# Patient Record
Sex: FEMALE | Race: BLACK OR AFRICAN AMERICAN | NOT HISPANIC OR LATINO | Employment: UNEMPLOYED | URBAN - METROPOLITAN AREA
[De-identification: names, ages, dates, MRNs, and addresses within clinical notes are randomized per-mention and may not be internally consistent; named-entity substitution may affect disease eponyms.]

---

## 2020-09-24 LAB
CHLAMYDIA NUCLEIC ACID SCREEN: NEGATIVE
GONOCOCCUS BY NUCLEIC ACID AMP: POSITIVE
PAP RECOMMENDATION EXT: ABNORMAL
PAP SMEAR: ABNORMAL

## 2022-04-07 ENCOUNTER — HISTORICAL (OUTPATIENT)
Dept: ADMINISTRATIVE | Facility: HOSPITAL | Age: 27
End: 2022-04-07
Payer: MEDICAID

## 2022-04-24 VITALS
WEIGHT: 160.94 LBS | DIASTOLIC BLOOD PRESSURE: 70 MMHG | SYSTOLIC BLOOD PRESSURE: 118 MMHG | BODY MASS INDEX: 27.48 KG/M2 | HEIGHT: 64 IN

## 2022-06-04 ENCOUNTER — HOSPITAL ENCOUNTER (EMERGENCY)
Facility: HOSPITAL | Age: 27
Discharge: HOME | End: 2022-06-04
Attending: EMERGENCY MEDICINE
Payer: MEDICAID

## 2022-06-04 VITALS
WEIGHT: 165 LBS | HEART RATE: 78 BPM | DIASTOLIC BLOOD PRESSURE: 74 MMHG | RESPIRATION RATE: 16 BRPM | OXYGEN SATURATION: 97 % | SYSTOLIC BLOOD PRESSURE: 128 MMHG | TEMPERATURE: 98 F | BODY MASS INDEX: 28.52 KG/M2

## 2022-06-04 DIAGNOSIS — J20.9 ACUTE BRONCHITIS, UNSPECIFIED ORGANISM: Primary | ICD-10-CM

## 2022-06-04 LAB
FLUAV AG UPPER RESP QL IA.RAPID: NOT DETECTED
FLUBV AG UPPER RESP QL IA.RAPID: NOT DETECTED
RSV A 5' UTR RNA NPH QL NAA+PROBE: NOT DETECTED
SARS-COV-2 RNA RESP QL NAA+PROBE: NOT DETECTED

## 2022-06-04 PROCEDURE — 87636 SARSCOV2 & INF A&B AMP PRB: CPT | Performed by: NURSE PRACTITIONER

## 2022-06-04 PROCEDURE — 99284 EMERGENCY DEPT VISIT MOD MDM: CPT | Mod: 25

## 2022-06-04 RX ORDER — PREDNISONE 20 MG/1
20 TABLET ORAL DAILY
Qty: 4 TABLET | Refills: 0 | Status: SHIPPED | OUTPATIENT
Start: 2022-06-04 | End: 2022-06-08

## 2022-06-04 RX ORDER — ALBUTEROL SULFATE 90 UG/1
2 AEROSOL, METERED RESPIRATORY (INHALATION) EVERY 6 HOURS PRN
Qty: 18 G | Refills: 0 | Status: SHIPPED | OUTPATIENT
Start: 2022-06-04 | End: 2022-07-04

## 2022-06-04 NOTE — ED PROVIDER NOTES
Encounter Date: 6/4/2022       History     Chief Complaint   Patient presents with    Chest Congestion     C/o sinus & chest congestion, headache, productive cough, & bodyaches starting early this morning     The history is provided by the patient.   URI  The primary symptoms include sore throat, cough and wheezing. Primary symptoms do not include fever, headaches, nausea or vomiting. Primary symptoms comment: runny nose, sinus pain. The current episode started today. This is a new problem.   The sore throat began today. The sore throat has been unchanged since its onset. The sore throat is not accompanied by trouble swallowing or hoarse voice. The sore throat pain is at a severity of 4/10.   The cough began today. The cough is productive. The sputum is yellow and brown.   Wheezing began yesterday. The wheezing has been resolved since its onset. The patient's medical history is significant for asthma.   Symptoms associated with the illness include facial pain, sinus pressure, congestion and rhinorrhea. The illness is not associated with chills. The following treatments were addressed: Acetaminophen was not tried. A decongestant was not tried. Aspirin was not tried. NSAIDs were not tried. Risk factors for severe complications from URI include chronic respiratory disease.     Review of patient's allergies indicates:  No Known Allergies  Past Medical History:   Diagnosis Date    Known health problems: none      Past Surgical History:   Procedure Laterality Date    none       History reviewed. No pertinent family history.  Social History     Tobacco Use    Smoking status: Current Every Day Smoker     Types: Cigarettes   Substance Use Topics    Alcohol use: Not Currently    Drug use: Never     Review of Systems   Constitutional: Negative for chills and fever.   HENT: Positive for congestion, rhinorrhea, sinus pressure and sore throat. Negative for hoarse voice and trouble swallowing.    Respiratory: Positive for  cough and wheezing. Negative for shortness of breath.    Gastrointestinal: Negative for diarrhea, nausea and vomiting.   Genitourinary: Negative for dysuria.   Musculoskeletal: Negative for back pain and neck pain.   Neurological: Negative for headaches.       Physical Exam     Initial Vitals [06/04/22 1607]   BP Pulse Resp Temp SpO2   (!) 134/92 94 18 99 °F (37.2 °C) 100 %      MAP       --         Physical Exam    Nursing note and vitals reviewed.  Constitutional: Vital signs are normal. She appears well-developed and well-nourished. She is cooperative.   HENT:   Head: Normocephalic and atraumatic.   Right Ear: External ear and ear canal normal.   Left Ear: External ear and ear canal normal.   Nose: Mucosal edema and rhinorrhea present. Right sinus exhibits no maxillary sinus tenderness and no frontal sinus tenderness. Left sinus exhibits no maxillary sinus tenderness and no frontal sinus tenderness.   Mouth/Throat: Uvula is midline, oropharynx is clear and moist and mucous membranes are normal. No oropharyngeal exudate.   TMs dull bilaterally   Eyes: Conjunctivae, EOM and lids are normal. Right conjunctiva is not injected. Left conjunctiva is not injected.   Neck:    Full passive range of motion without pain.     Cardiovascular: Normal rate and regular rhythm.   Pulmonary/Chest: Effort normal and breath sounds normal.   Abdominal: Abdomen is soft and flat. There is no abdominal tenderness.   Musculoskeletal:         General: Normal range of motion.      Cervical back: Full passive range of motion without pain.     Neurological: She is alert and oriented to person, place, and time. She has normal strength. Gait normal.   Skin: Skin is warm, dry and intact. No rash noted.   Psychiatric: She has a normal mood and affect. Her speech is normal and behavior is normal. Thought content normal. Cognition and memory are normal.         ED Course   Procedures  Labs Reviewed   COVID/RSV/FLU A&B PCR - Normal     Admission on  06/04/2022   Component Date Value Ref Range Status    Influenza A PCR 06/04/2022 Not Detected  Not Detected Final    Influenza B PCR 06/04/2022 Not Detected  Not Detected Final    Respiratory Syncytial Virus PCR 06/04/2022 Not Detected  Not Detected Final    SARS-CoV-2 PCR 06/04/2022 Not Detected  Not Detected Final            Imaging Results    None          Medications - No data to display  Medical Decision Making:   Clinical Tests:   Lab Tests: Ordered and Reviewed  Pt is awake, alert, oriented x3, in no acute distress, and vital signs stable.  Discussed with pt all pertinent ED information and results. Discussed pt dx of acute bronchitis  and plan of treatment. Gave pt follow up and return to the ED instructions. All questions and concerns were addressed at this time. Pt expresses understanding of information and instructions, and is comfortable with plan to discharge. Pt is stable for discharge.                        Clinical Impression:   Final diagnoses:  [J20.9] Acute bronchitis, unspecified organism (Primary)          ED Disposition Condition    Discharge Stable        ED Prescriptions     Medication Sig Dispense Start Date End Date Auth. Provider    albuterol (PROVENTIL/VENTOLIN HFA) 90 mcg/actuation inhaler Inhale 2 puffs into the lungs every 6 (six) hours as needed for Wheezing. Rescue 18 g 6/4/2022 7/4/2022 BRANDT Fagan    predniSONE (DELTASONE) 20 MG tablet Take 1 tablet (20 mg total) by mouth once daily. for 4 days 4 tablet 6/4/2022 6/8/2022 BRANDT Fagan        Follow-up Information    None          BRANDT Fagan  06/04/22 [id number]

## 2022-06-04 NOTE — DISCHARGE INSTRUCTIONS
Rest at home but not complete bedrest.  Make sure you are drinking adequate fluids.  Alternate acetaminophen 650 mg and ibuprofen 600 mg every 4 hours for fever/pain.  Follow up with primary care provider in 2-3 days  Return to ED for worsening of symptoms, difficulty breathing.  Claritin 10 mg daily as directed on over the counter package.

## 2022-07-11 LAB
PAP RECOMMENDATION EXT: NORMAL
PAP SMEAR: NORMAL

## 2022-08-26 ENCOUNTER — HOSPITAL ENCOUNTER (EMERGENCY)
Facility: HOSPITAL | Age: 27
Discharge: HOME | End: 2022-08-26
Attending: STUDENT IN AN ORGANIZED HEALTH CARE EDUCATION/TRAINING PROGRAM
Payer: MEDICAID

## 2022-08-26 VITALS
HEIGHT: 65 IN | SYSTOLIC BLOOD PRESSURE: 120 MMHG | DIASTOLIC BLOOD PRESSURE: 71 MMHG | RESPIRATION RATE: 18 BRPM | HEART RATE: 119 BPM | WEIGHT: 160.5 LBS | BODY MASS INDEX: 26.74 KG/M2 | OXYGEN SATURATION: 100 % | TEMPERATURE: 100 F

## 2022-08-26 DIAGNOSIS — U07.1 COVID-19: Primary | ICD-10-CM

## 2022-08-26 LAB
FLUAV AG UPPER RESP QL IA.RAPID: NOT DETECTED
FLUBV AG UPPER RESP QL IA.RAPID: NOT DETECTED
RSV A 5' UTR RNA NPH QL NAA+PROBE: NOT DETECTED
SARS-COV-2 RNA RESP QL NAA+PROBE: DETECTED

## 2022-08-26 PROCEDURE — 99282 EMERGENCY DEPT VISIT SF MDM: CPT

## 2022-08-26 PROCEDURE — 87636 SARSCOV2 & INF A&B AMP PRB: CPT | Performed by: NURSE PRACTITIONER

## 2022-08-27 NOTE — ED PROVIDER NOTES
"     Source of History:  Patient    Chief complaint:  Fever (Pt c/o fever, sweating and body aches starting today.)      HPI:  Harley Jones is a 27 y.o. female presenting with body aches, fever, sweating that started today.  Patient states she has been around her mother who was recently sick.  Patient denies any chest pain shortness of breath.  Patient is in no distress at this time.    This is the extent to the patients complaints today here in the emergency department.    ROS: As per HPI and below:  General:  Fever.  No chills.  Eyes: No visual changes.  ENT: No sore throat. No ear pain  Head: No headache.    Chest: No shortness of breath. No cough.  Cardiovascular: No chest pain.  Abdomen: No abdominal pain.  No nausea or vomiting.  Genito-Urinary: No abnormal urination.  Neurologic: No focal weakness.  No numbness.  MSK:  Generalized body aches.  Integument: No rashes or lesions.  Psych: No confusion      Review of patient's allergies indicates:  No Known Allergies    PMH:  As per HPI and below:  Past Medical History:   Diagnosis Date    Known health problems: none      Past Surgical History:   Procedure Laterality Date    none         Social History     Tobacco Use    Smoking status: Current Every Day Smoker     Types: Cigarettes   Substance Use Topics    Alcohol use: Not Currently    Drug use: Never       Physical Exam:    /71 (BP Location: Right arm, Patient Position: Sitting)   Pulse (!) 119   Temp 100.2 °F (37.9 °C)   Resp 18   Ht 5' 5" (1.651 m)   Wt 72.8 kg (160 lb 7.9 oz)   SpO2 100%   BMI 26.71 kg/m²   Nursing note and vital signs reviewed.  Appearance: febrile. Not toxic appearing. No acute distress.  Head: Atraumatic  Eyes: No conjunctival injection. No scleral icterus  ENT: Normal phonation  Chest/ Respiratory: No respiratory distress. No accessory muscle use.  Cardiovascular: Regular rate   Abdomen:  Not distended.    Musculoskeletal: Good range of motion all joints.  No " deformities.  Neck supple.  No meningismus.  Skin: No rashes seen.  Good turgor.  No ecchymoses.  Neurologic: GCS 15. Ambulates with a steady gait.   Mental Status:  Alert and oriented x 3.  Appropriate, conversant    Labs that have been ordered have been independently reviewed and interpreted by myself.    I decided to obtain the patient's medical records.  Summary of Medical Records:  Nursing documentation    Initial Impression/ Differential Dx:  COVID, flu, strep    MDM:    27 y.o. female with body aches, sweating, fever presents emergency department for evaluation.  Patient had COVID test that was done prior to me assuming care and was positive.                   Diagnostic Impression:    1. COVID-19         ED Disposition Condition    Discharge Stable          ED Prescriptions     None        Follow-up Information    None          BRANDT Lane  08/26/22 1947

## 2022-11-10 ENCOUNTER — HOSPITAL ENCOUNTER (OUTPATIENT)
Facility: HOSPITAL | Age: 27
Discharge: HOME | End: 2022-11-11
Attending: EMERGENCY MEDICINE | Admitting: SURGERY
Payer: MEDICAID

## 2022-11-10 ENCOUNTER — HOSPITAL ENCOUNTER (EMERGENCY)
Facility: HOSPITAL | Age: 27
Discharge: ANOTHER HOSPITAL | End: 2022-11-10
Attending: INTERNAL MEDICINE
Payer: MEDICAID

## 2022-11-10 VITALS
SYSTOLIC BLOOD PRESSURE: 117 MMHG | DIASTOLIC BLOOD PRESSURE: 65 MMHG | HEART RATE: 112 BPM | WEIGHT: 150 LBS | BODY MASS INDEX: 22.73 KG/M2 | TEMPERATURE: 98 F | OXYGEN SATURATION: 100 % | RESPIRATION RATE: 20 BRPM | HEIGHT: 68 IN

## 2022-11-10 DIAGNOSIS — F10.920 ACUTE ALCOHOLIC INTOXICATION WITHOUT COMPLICATION: ICD-10-CM

## 2022-11-10 DIAGNOSIS — S09.90XA INJURY OF HEAD, INITIAL ENCOUNTER: Primary | ICD-10-CM

## 2022-11-10 DIAGNOSIS — S00.10XA CONTUSION OF PERIOCULAR REGION, UNSPECIFIED LATERALITY, INITIAL ENCOUNTER: ICD-10-CM

## 2022-11-10 DIAGNOSIS — S06.9X9A CLOSED HEAD INJURY WITH LOSS OF CONSCIOUSNESS OF UNKNOWN DURATION: Primary | ICD-10-CM

## 2022-11-10 DIAGNOSIS — H05.231 PERIORBITAL HEMATOMA OF RIGHT EYE: ICD-10-CM

## 2022-11-10 DIAGNOSIS — R55 SYNCOPE: ICD-10-CM

## 2022-11-10 DIAGNOSIS — J96.90 RESPIRATORY FAILURE: ICD-10-CM
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
## 2022-11-10 DIAGNOSIS — F10.929 ALCOHOLIC INTOXICATION WITH COMPLICATION: ICD-10-CM
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
## 2022-11-10 DIAGNOSIS — R06.81 APNEA: ICD-10-CM

## 2022-11-10 LAB
ALBUMIN SERPL-MCNC: 4.4 GM/DL (ref 3.5–5)
ALBUMIN/GLOB SERPL: 1.3 RATIO (ref 1.1–2)
ALP SERPL-CCNC: 77 UNIT/L (ref 40–150)
ALT SERPL-CCNC: 23 UNIT/L (ref 0–55)
AMPHET UR QL SCN: NEGATIVE
APPEARANCE UR: CLEAR
APTT PPP: 27 SECONDS (ref 23.2–33.7)
AST SERPL-CCNC: 37 UNIT/L (ref 5–34)
B-HCG SERPL QL: NEGATIVE
BACTERIA #/AREA URNS AUTO: ABNORMAL /HPF
BARBITURATE SCN PRESENT UR: NEGATIVE
BASOPHILS # BLD AUTO: 0.02 X10(3)/MCL (ref 0–0.2)
BASOPHILS NFR BLD AUTO: 0.3 %
BENZODIAZ UR QL SCN: NEGATIVE
BILIRUB UR QL STRIP.AUTO: NEGATIVE MG/DL
BILIRUBIN DIRECT+TOT PNL SERPL-MCNC: 0.6 MG/DL
BUN SERPL-MCNC: 11 MG/DL (ref 7–18.7)
CALCIUM SERPL-MCNC: 9.1 MG/DL (ref 8.4–10.2)
CANNABINOIDS UR QL SCN: POSITIVE
CHLORIDE SERPL-SCNC: 108 MMOL/L (ref 98–107)
CO2 SERPL-SCNC: 22 MMOL/L (ref 22–29)
COCAINE UR QL SCN: NEGATIVE
COLOR UR AUTO: YELLOW
CORRECTED TEMPERATURE (PCO2): 36 MMHG (ref 35–45)
CORRECTED TEMPERATURE (PH): 7.35 (ref 7.35–7.45)
CORRECTED TEMPERATURE (PO2): 81 MMHG (ref 80–100)
CREAT SERPL-MCNC: 1.1 MG/DL (ref 0.55–1.02)
EOSINOPHIL # BLD AUTO: 0.01 X10(3)/MCL (ref 0–0.9)
EOSINOPHIL NFR BLD AUTO: 0.1 %
ERYTHROCYTE [DISTWIDTH] IN BLOOD BY AUTOMATED COUNT: 13 % (ref 11.5–17)
ETHANOL SERPL-MCNC: 188 MG/DL
FENTANYL UR QL SCN: NEGATIVE
GFR SERPLBLD CREATININE-BSD FMLA CKD-EPI: >60 MLS/MIN/1.73/M2
GLOBULIN SER-MCNC: 3.4 GM/DL (ref 2.4–3.5)
GLUCOSE SERPL-MCNC: 88 MG/DL (ref 74–100)
GLUCOSE UR QL STRIP.AUTO: NEGATIVE MG/DL
HCO3 UR-SCNC: 19.9 MMOL/L (ref 22–26)
HCT VFR BLD AUTO: 37 % (ref 37–47)
HGB BLD-MCNC: 12.2 G/DL (ref 12–16)
HGB BLD-MCNC: 12.5 GM/DL (ref 12–16)
IMM GRANULOCYTES # BLD AUTO: 0.02 X10(3)/MCL (ref 0–0.04)
IMM GRANULOCYTES NFR BLD AUTO: 0.3 %
INR BLD: 1.16 (ref 0–1.3)
KETONES UR QL STRIP.AUTO: NEGATIVE MG/DL
LACTATE SERPL-SCNC: 2.5 MMOL/L (ref 0.5–2.2)
LEUKOCYTE ESTERASE UR QL STRIP.AUTO: NEGATIVE UNIT/L
LYMPHOCYTES # BLD AUTO: 1.9 X10(3)/MCL (ref 0.6–4.6)
LYMPHOCYTES NFR BLD AUTO: 26.6 %
MCH RBC QN AUTO: 30 PG (ref 27–31)
MCHC RBC AUTO-ENTMCNC: 33.8 MG/DL (ref 33–36)
MCV RBC AUTO: 88.9 FL (ref 80–94)
MDMA UR QL SCN: NEGATIVE
MONOCYTES # BLD AUTO: 0.53 X10(3)/MCL (ref 0.1–1.3)
MONOCYTES NFR BLD AUTO: 7.4 %
NEUTROPHILS # BLD AUTO: 4.7 X10(3)/MCL (ref 2.1–9.2)
NEUTROPHILS NFR BLD AUTO: 65.3 %
NITRITE UR QL STRIP.AUTO: NEGATIVE
OPIATES UR QL SCN: NEGATIVE
PCO2 BLDA: 36 MMHG (ref 35–45)
PCP UR QL: NEGATIVE
PH SMN: 7.35 [PH] (ref 7.35–7.45)
PH UR STRIP.AUTO: 6 [PH]
PH UR: 6 [PH] (ref 3–11)
PLATELET # BLD AUTO: 272 X10(3)/MCL (ref 130–400)
PMV BLD AUTO: 11.9 FL (ref 7.4–10.4)
PO2 BLDA: 81 MMHG (ref 80–100)
POC BASE DEFICIT: -5.1 MMOL/L (ref -2–2)
POC COHB: 1.9 %
POC IONIZED CALCIUM: 1.08 MMOL/L (ref 1.12–1.23)
POC METHB: 1.1 % (ref 0.4–1.5)
POC O2HB: 94.2 % (ref 94–97)
POC SATURATED O2: 95.3 %
POC TEMPERATURE: 37 °C
POTASSIUM BLD-SCNC: 3.7 MMOL/L (ref 3.5–5)
POTASSIUM SERPL-SCNC: 3.6 MMOL/L (ref 3.5–5.1)
PROT SERPL-MCNC: 7.8 GM/DL (ref 6.4–8.3)
PROT UR QL STRIP.AUTO: ABNORMAL MG/DL
PROTHROMBIN TIME: 14.7 SECONDS (ref 12.5–14.5)
RBC # BLD AUTO: 4.16 X10(6)/MCL (ref 4.2–5.4)
RBC #/AREA URNS AUTO: ABNORMAL /HPF
RBC UR QL AUTO: ABNORMAL UNIT/L
SODIUM BLD-SCNC: 140 MMOL/L (ref 137–145)
SODIUM SERPL-SCNC: 143 MMOL/L (ref 136–145)
SP GR UR STRIP.AUTO: 1.01
SPECIFIC GRAVITY, URINE AUTO (.000) (OHS): 1.01 (ref 1–1.03)
SPECIMEN SOURCE: ABNORMAL
SQUAMOUS #/AREA URNS AUTO: ABNORMAL /HPF
T VAGINALIS URNS QL MICRO: ABNORMAL /HPF
UROBILINOGEN UR STRIP-ACNC: 0.2 MG/DL
WBC # SPEC AUTO: 7.2 X10(3)/MCL (ref 4.5–11.5)
WBC #/AREA URNS AUTO: ABNORMAL /HPF

## 2022-11-10 PROCEDURE — 81003 URINALYSIS AUTO W/O SCOPE: CPT | Mod: 59 | Performed by: INTERNAL MEDICINE

## 2022-11-10 PROCEDURE — 96361 HYDRATE IV INFUSION ADD-ON: CPT

## 2022-11-10 PROCEDURE — 27100080 HC AIRWAY ADAPTER-END TIDAL CO2

## 2022-11-10 PROCEDURE — 94761 N-INVAS EAR/PLS OXIMETRY MLT: CPT

## 2022-11-10 PROCEDURE — 27200966 HC CLOSED SUCTION SYSTEM

## 2022-11-10 PROCEDURE — 99291 CRITICAL CARE FIRST HOUR: CPT

## 2022-11-10 PROCEDURE — 63600175 PHARM REV CODE 636 W HCPCS: Performed by: EMERGENCY MEDICINE

## 2022-11-10 PROCEDURE — 80053 COMPREHEN METABOLIC PANEL: CPT | Performed by: INTERNAL MEDICINE

## 2022-11-10 PROCEDURE — 83605 ASSAY OF LACTIC ACID: CPT | Performed by: NURSE PRACTITIONER

## 2022-11-10 PROCEDURE — 93005 ELECTROCARDIOGRAM TRACING: CPT

## 2022-11-10 PROCEDURE — 25500020 PHARM REV CODE 255: Performed by: EMERGENCY MEDICINE

## 2022-11-10 PROCEDURE — G0378 HOSPITAL OBSERVATION PER HR: HCPCS

## 2022-11-10 PROCEDURE — 25000003 PHARM REV CODE 250: Performed by: INTERNAL MEDICINE

## 2022-11-10 PROCEDURE — 99900035 HC TECH TIME PER 15 MIN (STAT)

## 2022-11-10 PROCEDURE — 85025 COMPLETE CBC W/AUTO DIFF WBC: CPT | Performed by: INTERNAL MEDICINE

## 2022-11-10 PROCEDURE — 96374 THER/PROPH/DIAG INJ IV PUSH: CPT

## 2022-11-10 PROCEDURE — 82077 ASSAY SPEC XCP UR&BREATH IA: CPT | Performed by: INTERNAL MEDICINE

## 2022-11-10 PROCEDURE — 96365 THER/PROPH/DIAG IV INF INIT: CPT

## 2022-11-10 PROCEDURE — 96375 TX/PRO/DX INJ NEW DRUG ADDON: CPT | Mod: 59

## 2022-11-10 PROCEDURE — 85610 PROTHROMBIN TIME: CPT | Performed by: INTERNAL MEDICINE

## 2022-11-10 PROCEDURE — 94002 VENT MGMT INPAT INIT DAY: CPT | Mod: 59

## 2022-11-10 PROCEDURE — 25000003 PHARM REV CODE 250: Performed by: NURSE PRACTITIONER

## 2022-11-10 PROCEDURE — 93010 EKG 12-LEAD: ICD-10-PCS | Mod: ,,, | Performed by: INTERNAL MEDICINE

## 2022-11-10 PROCEDURE — 31500 INSERT EMERGENCY AIRWAY: CPT

## 2022-11-10 PROCEDURE — 81025 URINE PREGNANCY TEST: CPT | Performed by: INTERNAL MEDICINE

## 2022-11-10 PROCEDURE — 96372 THER/PROPH/DIAG INJ SC/IM: CPT | Performed by: NURSE PRACTITIONER

## 2022-11-10 PROCEDURE — 96366 THER/PROPH/DIAG IV INF ADDON: CPT

## 2022-11-10 PROCEDURE — 81001 URINALYSIS AUTO W/SCOPE: CPT | Performed by: INTERNAL MEDICINE

## 2022-11-10 PROCEDURE — 80307 DRUG TEST PRSMV CHEM ANLYZR: CPT | Performed by: INTERNAL MEDICINE

## 2022-11-10 PROCEDURE — 85730 THROMBOPLASTIN TIME PARTIAL: CPT | Performed by: INTERNAL MEDICINE

## 2022-11-10 PROCEDURE — 25000003 PHARM REV CODE 250

## 2022-11-10 PROCEDURE — 36600 WITHDRAWAL OF ARTERIAL BLOOD: CPT | Mod: 59

## 2022-11-10 PROCEDURE — 99291 CRITICAL CARE FIRST HOUR: CPT | Mod: 25

## 2022-11-10 PROCEDURE — 63600175 PHARM REV CODE 636 W HCPCS

## 2022-11-10 PROCEDURE — 93010 ELECTROCARDIOGRAM REPORT: CPT | Mod: ,,, | Performed by: INTERNAL MEDICINE

## 2022-11-10 PROCEDURE — 82803 BLOOD GASES ANY COMBINATION: CPT

## 2022-11-10 PROCEDURE — 63600175 PHARM REV CODE 636 W HCPCS: Performed by: NURSE PRACTITIONER

## 2022-11-10 RX ORDER — ONDANSETRON 2 MG/ML
INJECTION INTRAMUSCULAR; INTRAVENOUS
Status: COMPLETED
Start: 2022-11-10 | End: 2022-11-10

## 2022-11-10 RX ORDER — ADHESIVE BANDAGE
30 BANDAGE TOPICAL DAILY PRN
Status: DISCONTINUED | OUTPATIENT
Start: 2022-11-10 | End: 2022-11-11 | Stop reason: HOSPADM

## 2022-11-10 RX ORDER — DOCUSATE SODIUM 100 MG/1
100 CAPSULE, LIQUID FILLED ORAL 2 TIMES DAILY
Status: DISCONTINUED | OUTPATIENT
Start: 2022-11-10 | End: 2022-11-11 | Stop reason: HOSPADM

## 2022-11-10 RX ORDER — ROCURONIUM BROMIDE 10 MG/ML
50 INJECTION, SOLUTION INTRAVENOUS ONCE
Status: COMPLETED | OUTPATIENT
Start: 2022-11-10 | End: 2022-11-10

## 2022-11-10 RX ORDER — SODIUM CHLORIDE 9 MG/ML
1000 INJECTION, SOLUTION INTRAVENOUS
Status: DISCONTINUED | OUTPATIENT
Start: 2022-11-10 | End: 2022-11-10 | Stop reason: HOSPADM

## 2022-11-10 RX ORDER — OXYCODONE HYDROCHLORIDE 5 MG/1
10 TABLET ORAL EVERY 4 HOURS PRN
Status: DISCONTINUED | OUTPATIENT
Start: 2022-11-10 | End: 2022-11-10

## 2022-11-10 RX ORDER — GABAPENTIN 300 MG/1
300 CAPSULE ORAL 3 TIMES DAILY
Status: DISCONTINUED | OUTPATIENT
Start: 2022-11-10 | End: 2022-11-11 | Stop reason: HOSPADM

## 2022-11-10 RX ORDER — FENTANYL CITRATE 50 UG/ML
INJECTION, SOLUTION INTRAMUSCULAR; INTRAVENOUS
Status: DISCONTINUED
Start: 2022-11-10 | End: 2022-11-10 | Stop reason: WASHOUT

## 2022-11-10 RX ORDER — ONDANSETRON 2 MG/ML
4 INJECTION INTRAMUSCULAR; INTRAVENOUS
Status: COMPLETED | OUTPATIENT
Start: 2022-11-10 | End: 2022-11-10

## 2022-11-10 RX ORDER — PROPOFOL 10 MG/ML
20 INJECTION, EMULSION INTRAVENOUS
Status: DISCONTINUED | OUTPATIENT
Start: 2022-11-10 | End: 2022-11-10 | Stop reason: HOSPADM

## 2022-11-10 RX ORDER — OXYCODONE HYDROCHLORIDE 5 MG/1
5 TABLET ORAL EVERY 4 HOURS PRN
Status: DISCONTINUED | OUTPATIENT
Start: 2022-11-10 | End: 2022-11-11 | Stop reason: HOSPADM

## 2022-11-10 RX ORDER — PROPOFOL 10 MG/ML
INJECTION, EMULSION INTRAVENOUS
Status: COMPLETED
Start: 2022-11-10 | End: 2022-11-10

## 2022-11-10 RX ORDER — POLYETHYLENE GLYCOL 3350 17 G/17G
17 POWDER, FOR SOLUTION ORAL 2 TIMES DAILY
Status: DISCONTINUED | OUTPATIENT
Start: 2022-11-10 | End: 2022-11-11 | Stop reason: HOSPADM

## 2022-11-10 RX ORDER — ACETAMINOPHEN 325 MG/1
650 TABLET ORAL EVERY 4 HOURS
Status: DISCONTINUED | OUTPATIENT
Start: 2022-11-10 | End: 2022-11-11 | Stop reason: HOSPADM

## 2022-11-10 RX ORDER — TALC
6 POWDER (GRAM) TOPICAL NIGHTLY PRN
Status: DISCONTINUED | OUTPATIENT
Start: 2022-11-10 | End: 2022-11-11 | Stop reason: HOSPADM

## 2022-11-10 RX ORDER — FENTANYL CITRATE 50 UG/ML
50 INJECTION, SOLUTION INTRAMUSCULAR; INTRAVENOUS
Status: COMPLETED | OUTPATIENT
Start: 2022-11-10 | End: 2022-11-10

## 2022-11-10 RX ORDER — ENOXAPARIN SODIUM 100 MG/ML
40 INJECTION SUBCUTANEOUS EVERY 12 HOURS
Status: DISCONTINUED | OUTPATIENT
Start: 2022-11-10 | End: 2022-11-11 | Stop reason: HOSPADM

## 2022-11-10 RX ORDER — SODIUM CHLORIDE 9 MG/ML
INJECTION, SOLUTION INTRAVENOUS CONTINUOUS
Status: DISCONTINUED | OUTPATIENT
Start: 2022-11-10 | End: 2022-11-11 | Stop reason: HOSPADM

## 2022-11-10 RX ORDER — ETOMIDATE 2 MG/ML
10 INJECTION INTRAVENOUS
Status: COMPLETED | OUTPATIENT
Start: 2022-11-10 | End: 2022-11-10

## 2022-11-10 RX ORDER — METHOCARBAMOL 750 MG/1
750 TABLET, FILM COATED ORAL 3 TIMES DAILY
Status: DISCONTINUED | OUTPATIENT
Start: 2022-11-10 | End: 2022-11-11 | Stop reason: HOSPADM

## 2022-11-10 RX ADMIN — ONDANSETRON 4 MG: 2 INJECTION INTRAMUSCULAR; INTRAVENOUS at 06:11

## 2022-11-10 RX ADMIN — ROCURONIUM BROMIDE 50 MG: 10 INJECTION, SOLUTION INTRAVENOUS at 04:11

## 2022-11-10 RX ADMIN — METHOCARBAMOL 750 MG: 750 TABLET ORAL at 09:11

## 2022-11-10 RX ADMIN — SODIUM CHLORIDE 1360 ML: 9 INJECTION, SOLUTION INTRAVENOUS at 05:11

## 2022-11-10 RX ADMIN — SODIUM CHLORIDE: 9 INJECTION, SOLUTION INTRAVENOUS at 09:11

## 2022-11-10 RX ADMIN — IOPAMIDOL 100 ML: 755 INJECTION, SOLUTION INTRAVENOUS at 06:11

## 2022-11-10 RX ADMIN — ETOMIDATE 10 MG: 2 INJECTION INTRAVENOUS at 04:11

## 2022-11-10 RX ADMIN — FENTANYL CITRATE 50 MCG: 50 INJECTION INTRAMUSCULAR; INTRAVENOUS at 07:11

## 2022-11-10 RX ADMIN — ACETAMINOPHEN 650 MG: 325 TABLET, FILM COATED ORAL at 09:11

## 2022-11-10 RX ADMIN — DOCUSATE SODIUM 100 MG: 100 CAPSULE, LIQUID FILLED ORAL at 09:11

## 2022-11-10 RX ADMIN — ENOXAPARIN SODIUM 40 MG: 40 INJECTION SUBCUTANEOUS at 09:11

## 2022-11-10 RX ADMIN — GABAPENTIN 300 MG: 300 CAPSULE ORAL at 09:11

## 2022-11-10 RX ADMIN — PROPOFOL 1000 MG: 10 INJECTION, EMULSION INTRAVENOUS at 05:11
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
## 2022-11-11 VITALS
DIASTOLIC BLOOD PRESSURE: 63 MMHG | SYSTOLIC BLOOD PRESSURE: 110 MMHG | TEMPERATURE: 98 F | RESPIRATION RATE: 16 BRPM | OXYGEN SATURATION: 98 % | HEART RATE: 76 BPM

## 2022-11-11 LAB
ALBUMIN SERPL-MCNC: 3.9 GM/DL (ref 3.5–5)
ALBUMIN/GLOB SERPL: 1.4 RATIO (ref 1.1–2)
ALP SERPL-CCNC: 90 UNIT/L (ref 40–150)
ALT SERPL-CCNC: 23 UNIT/L (ref 0–55)
AST SERPL-CCNC: 38 UNIT/L (ref 5–34)
BASOPHILS # BLD AUTO: 0.04 X10(3)/MCL (ref 0–0.2)
BASOPHILS NFR BLD AUTO: 0.6 %
BILIRUBIN DIRECT+TOT PNL SERPL-MCNC: 1 MG/DL
BUN SERPL-MCNC: 10.2 MG/DL (ref 7–18.7)
CALCIUM SERPL-MCNC: 8.7 MG/DL (ref 8.4–10.2)
CHLORIDE SERPL-SCNC: 107 MMOL/L (ref 98–107)
CO2 SERPL-SCNC: 21 MMOL/L (ref 22–29)
CREAT SERPL-MCNC: 0.82 MG/DL (ref 0.55–1.02)
EOSINOPHIL # BLD AUTO: 0.05 X10(3)/MCL (ref 0–0.9)
EOSINOPHIL NFR BLD AUTO: 0.7 %
ERYTHROCYTE [DISTWIDTH] IN BLOOD BY AUTOMATED COUNT: 13.3 % (ref 11.5–17)
GFR SERPLBLD CREATININE-BSD FMLA CKD-EPI: >60 MLS/MIN/1.73/M2
GLOBULIN SER-MCNC: 2.7 GM/DL (ref 2.4–3.5)
GLUCOSE SERPL-MCNC: 65 MG/DL (ref 74–100)
HCT VFR BLD AUTO: 35.5 % (ref 37–47)
HGB BLD-MCNC: 11.9 GM/DL (ref 12–16)
IMM GRANULOCYTES # BLD AUTO: 0.03 X10(3)/MCL (ref 0–0.04)
IMM GRANULOCYTES NFR BLD AUTO: 0.4 %
LACTATE SERPL-SCNC: 0.6 MMOL/L (ref 0.5–2.2)
LYMPHOCYTES # BLD AUTO: 2.23 X10(3)/MCL (ref 0.6–4.6)
LYMPHOCYTES NFR BLD AUTO: 33.4 %
MAGNESIUM SERPL-MCNC: 1.8 MG/DL (ref 1.6–2.6)
MCH RBC QN AUTO: 30.8 PG (ref 27–31)
MCHC RBC AUTO-ENTMCNC: 33.5 MG/DL (ref 33–36)
MCV RBC AUTO: 92 FL (ref 80–94)
MONOCYTES # BLD AUTO: 0.47 X10(3)/MCL (ref 0.1–1.3)
MONOCYTES NFR BLD AUTO: 7 %
NEUTROPHILS # BLD AUTO: 3.9 X10(3)/MCL (ref 2.1–9.2)
NEUTROPHILS NFR BLD AUTO: 57.9 %
NRBC BLD AUTO-RTO: 0 %
PHOSPHATE SERPL-MCNC: 3.8 MG/DL (ref 2.3–4.7)
PLATELET # BLD AUTO: 208 X10(3)/MCL (ref 130–400)
PMV BLD AUTO: 11.5 FL (ref 7.4–10.4)
POTASSIUM SERPL-SCNC: 3.7 MMOL/L (ref 3.5–5.1)
PROT SERPL-MCNC: 6.6 GM/DL (ref 6.4–8.3)
RBC # BLD AUTO: 3.86 X10(6)/MCL (ref 4.2–5.4)
SODIUM SERPL-SCNC: 138 MMOL/L (ref 136–145)
WBC # SPEC AUTO: 6.7 X10(3)/MCL (ref 4.5–11.5)

## 2022-11-11 PROCEDURE — 83735 ASSAY OF MAGNESIUM: CPT | Performed by: NURSE PRACTITIONER

## 2022-11-11 PROCEDURE — G0378 HOSPITAL OBSERVATION PER HR: HCPCS

## 2022-11-11 PROCEDURE — 63600175 PHARM REV CODE 636 W HCPCS: Performed by: NURSE PRACTITIONER

## 2022-11-11 PROCEDURE — 36415 COLL VENOUS BLD VENIPUNCTURE: CPT | Performed by: NURSE PRACTITIONER

## 2022-11-11 PROCEDURE — 63600175 PHARM REV CODE 636 W HCPCS: Performed by: STUDENT IN AN ORGANIZED HEALTH CARE EDUCATION/TRAINING PROGRAM

## 2022-11-11 PROCEDURE — 85025 COMPLETE CBC W/AUTO DIFF WBC: CPT | Performed by: NURSE PRACTITIONER

## 2022-11-11 PROCEDURE — 84100 ASSAY OF PHOSPHORUS: CPT | Performed by: NURSE PRACTITIONER

## 2022-11-11 PROCEDURE — 25000003 PHARM REV CODE 250: Performed by: NURSE PRACTITIONER

## 2022-11-11 PROCEDURE — 83605 ASSAY OF LACTIC ACID: CPT | Performed by: NURSE PRACTITIONER

## 2022-11-11 PROCEDURE — 80053 COMPREHEN METABOLIC PANEL: CPT | Performed by: NURSE PRACTITIONER

## 2022-11-11 PROCEDURE — 96372 THER/PROPH/DIAG INJ SC/IM: CPT | Performed by: NURSE PRACTITIONER

## 2022-11-11 RX ORDER — MAGNESIUM SULFATE HEPTAHYDRATE 40 MG/ML
2 INJECTION, SOLUTION INTRAVENOUS ONCE
Status: COMPLETED | OUTPATIENT
Start: 2022-11-11 | End: 2022-11-11

## 2022-11-11 RX ORDER — OXYCODONE HYDROCHLORIDE 5 MG/1
5 TABLET ORAL EVERY 4 HOURS PRN
Qty: 10 TABLET | Refills: 0 | Status: SHIPPED | OUTPATIENT
Start: 2022-11-11 | End: 2023-07-13

## 2022-11-11 RX ADMIN — OXYCODONE 5 MG: 5 TABLET ORAL at 01:11

## 2022-11-11 RX ADMIN — POLYETHYLENE GLYCOL 3350 17 G: 17 POWDER, FOR SOLUTION ORAL at 08:11

## 2022-11-11 RX ADMIN — GABAPENTIN 300 MG: 300 CAPSULE ORAL at 08:11

## 2022-11-11 RX ADMIN — MAGNESIUM SULFATE HEPTAHYDRATE 2 G: 40 INJECTION, SOLUTION INTRAVENOUS at 08:11

## 2022-11-11 RX ADMIN — ENOXAPARIN SODIUM 40 MG: 40 INJECTION SUBCUTANEOUS at 08:11

## 2022-11-11 RX ADMIN — ACETAMINOPHEN 650 MG: 325 TABLET, FILM COATED ORAL at 06:11

## 2022-11-11 RX ADMIN — ACETAMINOPHEN 650 MG: 325 TABLET, FILM COATED ORAL at 09:11

## 2022-11-11 RX ADMIN — OXYCODONE 5 MG: 5 TABLET ORAL at 11:11

## 2022-11-11 RX ADMIN — DOCUSATE SODIUM 100 MG: 100 CAPSULE, LIQUID FILLED ORAL at 08:11

## 2022-11-11 RX ADMIN — METHOCARBAMOL 750 MG: 750 TABLET ORAL at 08:11

## 2022-11-11 NOTE — ASSESSMENT & PLAN NOTE
Admit to floor  q2 neuro checks  CT in AM if not improved  Lovenox  Regular diet  DC in next 12-24 hours
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
## 2022-11-11 NOTE — SUBJECTIVE & OBJECTIVE
Current Facility-Administered Medications on File Prior to Encounter   Medication    [COMPLETED] etomidate injection 10 mg    [COMPLETED] propofoL (DIPRIVAN) 10 mg/mL infusion    [COMPLETED] rocuronium injection 50 mg    [DISCONTINUED] 0.9%  NaCl infusion    [DISCONTINUED] propofol (DIPRIVAN) 10 mg/mL infusion    [COMPLETED] sodium chloride 0.9% bolus 1,360 mL     Current Outpatient Medications on File Prior to Encounter   Medication Sig    albuterol (PROVENTIL/VENTOLIN HFA) 90 mcg/actuation inhaler Inhale 2 puffs into the lungs every 6 (six) hours as needed for Wheezing. Rescue       Review of patient's allergies indicates:  No Known Allergies    Past Medical History:   Diagnosis Date    Asthma     Known health problems: none      Past Surgical History:   Procedure Laterality Date     SECTION      none       Family History       Problem Relation (Age of Onset)    Hypertension Mother, Father          Tobacco Use    Smoking status: Every Day     Types: Cigarettes    Smokeless tobacco: Not on file   Substance and Sexual Activity    Alcohol use: Not Currently    Drug use: Never    Sexual activity: Not on file     Review of Systems   Constitutional:  Negative for chills and fever.   HENT:  Positive for facial swelling. Negative for ear pain and trouble swallowing.    Eyes:  Negative for pain and redness.   Respiratory:  Negative for cough and chest tightness.    Cardiovascular:  Negative for chest pain, palpitations and leg swelling.   Gastrointestinal:  Negative for abdominal distention, abdominal pain, nausea and vomiting.   Genitourinary:  Negative for difficulty urinating.   Musculoskeletal:  Negative for back pain and neck pain.   Skin:  Positive for wound. Negative for color change and pallor.   Neurological:  Positive for dizziness, speech difficulty and light-headedness. Negative for syncope, weakness, numbness and headaches.   Psychiatric/Behavioral:  Negative for agitation and suicidal ideas.    All  other systems reviewed and are negative.  Objective:     Vital Signs (Most Recent):  Temp: 98.1 °F (36.7 °C) (11/10/22 1829)  Pulse: 88 (11/10/22 1835)  Resp: 16 (11/10/22 1939)  BP: 123/79 (11/10/22 1835)  SpO2: 96 % (11/10/22 1835) Vital Signs (24h Range):  Temp:  [98.1 °F (36.7 °C)-98.4 °F (36.9 °C)] 98.1 °F (36.7 °C)  Pulse:  [] 88  Resp:  [16-33] 16  SpO2:  [93 %-100 %] 96 %  BP: (117-172)/() 123/79        There is no height or weight on file to calculate BMI.    Physical Exam  Constitutional:       Appearance: Normal appearance.   HENT:      Head: Normocephalic and atraumatic.      Nose: Nose normal.   Eyes:      Pupils: Pupils are equal, round, and reactive to light.      Comments: Swelling and bruising to L eye.    Cardiovascular:      Rate and Rhythm: Normal rate.      Pulses: Normal pulses.      Comments: Normal peripheral pulses  Pulmonary:      Effort: Pulmonary effort is normal. No respiratory distress.   Chest:      Chest wall: No tenderness.   Abdominal:      General: Abdomen is flat. Bowel sounds are normal. There is no distension.      Palpations: Abdomen is soft.      Tenderness: There is no abdominal tenderness.   Musculoskeletal:         General: No swelling, tenderness, deformity or signs of injury.      Cervical back: Normal range of motion and neck supple. No tenderness.   Skin:     General: Skin is warm and dry.      Capillary Refill: Capillary refill takes less than 2 seconds.      Findings: No lesion.   Neurological:      General: No focal deficit present.      Mental Status: She is alert.      Comments: Opens eyes to voice, shakes head, does not speak, follows commands.    Psychiatric:         Mood and Affect: Mood normal.         Behavior: Behavior normal.         Thought Content: Thought content normal.         Judgment: Judgment normal.       Significant Labs:  I have reviewed all pertinent lab results within the past 24 hours.    Significant Diagnostics:  I have reviewed  all pertinent imaging results/findings within the past 24 hours.
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
## 2023-01-27 ENCOUNTER — DOCUMENTATION ONLY (OUTPATIENT)
Dept: ADMINISTRATIVE | Facility: HOSPITAL | Age: 28
End: 2023-01-27
Payer: MEDICAID

## 2023-01-31 ENCOUNTER — HOSPITAL ENCOUNTER (EMERGENCY)
Facility: HOSPITAL | Age: 28
Discharge: HOME | End: 2023-01-31
Attending: INTERNAL MEDICINE
Payer: MEDICAID

## 2023-01-31 VITALS
HEIGHT: 66 IN | DIASTOLIC BLOOD PRESSURE: 82 MMHG | BODY MASS INDEX: 27.16 KG/M2 | TEMPERATURE: 98 F | OXYGEN SATURATION: 100 % | WEIGHT: 169 LBS | RESPIRATION RATE: 20 BRPM | SYSTOLIC BLOOD PRESSURE: 131 MMHG | HEART RATE: 91 BPM

## 2023-01-31 DIAGNOSIS — J20.9 ACUTE BRONCHITIS, UNSPECIFIED ORGANISM: Primary | ICD-10-CM

## 2023-01-31 LAB
FLUAV AG UPPER RESP QL IA.RAPID: NOT DETECTED
FLUBV AG UPPER RESP QL IA.RAPID: NOT DETECTED
SARS-COV-2 RNA RESP QL NAA+PROBE: NOT DETECTED

## 2023-01-31 PROCEDURE — 0240U COVID/FLU A&B PCR: CPT | Performed by: INTERNAL MEDICINE

## 2023-01-31 PROCEDURE — 94640 AIRWAY INHALATION TREATMENT: CPT

## 2023-01-31 PROCEDURE — 25000242 PHARM REV CODE 250 ALT 637 W/ HCPCS: Performed by: NURSE PRACTITIONER

## 2023-01-31 PROCEDURE — 99284 EMERGENCY DEPT VISIT MOD MDM: CPT | Mod: 25

## 2023-01-31 PROCEDURE — 63600175 PHARM REV CODE 636 W HCPCS: Performed by: NURSE PRACTITIONER

## 2023-01-31 PROCEDURE — 94761 N-INVAS EAR/PLS OXIMETRY MLT: CPT

## 2023-01-31 PROCEDURE — 99900031 HC PATIENT EDUCATION (STAT)

## 2023-01-31 RX ORDER — PROMETHAZINE HYDROCHLORIDE AND DEXTROMETHORPHAN HYDROBROMIDE 6.25; 15 MG/5ML; MG/5ML
5 SYRUP ORAL EVERY 8 HOURS PRN
Qty: 150 ML | Refills: 0 | Status: SHIPPED | OUTPATIENT
Start: 2023-01-31 | End: 2023-02-10

## 2023-01-31 RX ORDER — ALBUTEROL SULFATE 90 UG/1
1-2 AEROSOL, METERED RESPIRATORY (INHALATION) EVERY 6 HOURS PRN
Qty: 18 G | Refills: 0 | Status: SHIPPED | OUTPATIENT
Start: 2023-01-31 | End: 2023-07-13

## 2023-01-31 RX ORDER — IPRATROPIUM BROMIDE AND ALBUTEROL SULFATE 2.5; .5 MG/3ML; MG/3ML
3 SOLUTION RESPIRATORY (INHALATION)
Status: COMPLETED | OUTPATIENT
Start: 2023-01-31 | End: 2023-01-31

## 2023-01-31 RX ORDER — PREDNISONE 20 MG/1
40 TABLET ORAL
Status: COMPLETED | OUTPATIENT
Start: 2023-01-31 | End: 2023-01-31

## 2023-01-31 RX ORDER — PREDNISONE 20 MG/1
40 TABLET ORAL DAILY
Qty: 10 TABLET | Refills: 0 | Status: SHIPPED | OUTPATIENT
Start: 2023-01-31 | End: 2023-02-05

## 2023-01-31 RX ADMIN — PREDNISONE 40 MG: 20 TABLET ORAL at 12:01

## 2023-01-31 RX ADMIN — IPRATROPIUM BROMIDE AND ALBUTEROL SULFATE 3 ML: .5; 3 SOLUTION RESPIRATORY (INHALATION) at 12:01

## 2023-01-31 NOTE — ED PROVIDER NOTES
Encounter Date: 2023       History     Chief Complaint   Patient presents with    Cough     Cough, runny nose and fever since last night     29 yo female presents with her daughter (who is also sick) for cough, wheezing, congestion and subjective fever. She states she has hx asthma, out of inhaler. Nonsmoker. She feels tight and wheezing.     The history is provided by the patient. No  was used.   Cough  This is a new problem. The current episode started today. Associated symptoms include wheezing.   Review of patient's allergies indicates:  No Known Allergies  Past Medical History:   Diagnosis Date    Asthma     Known health problems: none      Past Surgical History:   Procedure Laterality Date     SECTION      none       Family History   Problem Relation Age of Onset    Hypertension Mother     Hypertension Father      Social History     Tobacco Use    Smoking status: Former     Types: Cigarettes    Smokeless tobacco: Never   Substance Use Topics    Alcohol use: Not Currently    Drug use: Yes     Types: Marijuana     Review of Systems   Constitutional:  Positive for fever.   HENT:  Positive for congestion and postnasal drip.    Respiratory:  Positive for cough and wheezing.    All other systems reviewed and are negative.    Physical Exam     Initial Vitals [23 1059]   BP Pulse Resp Temp SpO2   136/87 94 (!) 22 98.1 °F (36.7 °C) 98 %      MAP       --         Physical Exam    Nursing note and vitals reviewed.  Constitutional: She appears well-developed and well-nourished.   HENT:   Left Ear: Tympanic membrane and ear canal normal.   Right ear canal cerumen impaction noted, unable to visualize TM. No pain with palpation or tugging   Eyes: Conjunctivae are normal.   Cardiovascular:  Normal rate, regular rhythm and normal heart sounds.           Pulmonary/Chest: No respiratory distress. She has wheezes (diffuse, anterior and posterior).     Neurological: She is alert and oriented  to person, place, and time.   Skin: Skin is warm and dry.   Psychiatric: She has a normal mood and affect.       ED Course   Procedures  Labs Reviewed   COVID/FLU A&B PCR - Normal    Narrative:     The Xpert Xpress SARS-CoV-2/FLU/RSV plus is a rapid, multiplexed real-time PCR test intended for the simultaneous qualitative detection and differentiation of SARS-CoV-2, Influenza A, Influenza B, and respiratory syncytial virus (RSV) viral RNA in either nasopharyngeal swab or nasal swab specimens.                Imaging Results    None          Medications   albuterol-ipratropium 2.5 mg-0.5 mg/3 mL nebulizer solution 3 mL (3 mLs Nebulization Given 1/31/23 1243)   predniSONE tablet 40 mg (40 mg Oral Given 1/31/23 1230)     Medical Decision Making:   History:   Old Medical Records: I decided to obtain old medical records.  Old Records Summarized: other records.       <> Summary of Records: Reviewed medication list - no recent inahlers RX    Also reviewed previous ER visits for similar of cough and wheezing - bronchitis  Differential Diagnosis:   URI; bronchitis; covid; flu  Clinical Tests:   Lab Tests: Ordered and Reviewed       <> Summary of Lab: Covid/flu negative  ED Management:  Swab negative. +wheezing noted -- duo neb given along with prednisone. Will treat as URI vs bronchitis                        Clinical Impression:   Final diagnoses:  [J20.9] Acute bronchitis, unspecified organism (Primary)        ED Disposition Condition    Discharge Stable          ED Prescriptions       Medication Sig Dispense Start Date End Date Auth. Provider    predniSONE (DELTASONE) 20 MG tablet Take 2 tablets (40 mg total) by mouth once daily. for 5 days 10 tablet 1/31/2023 2/5/2023 BRANDT Smith    albuterol (PROVENTIL/VENTOLIN HFA) 90 mcg/actuation inhaler Inhale 1-2 puffs into the lungs every 6 (six) hours as needed for Wheezing. Rescue 18 g 1/31/2023 -- BRANDT Smith    promethazine-dextromethorphan (PROMETHAZINE-DM)  6.25-15 mg/5 mL Syrp Take 5 mLs by mouth every 8 (eight) hours as needed (cough). 150 mL 1/31/2023 2/10/2023 BRANDT Smith          Follow-up Information       Follow up With Specialties Details Why Contact Info    primary care provider  Call in 1 week As needed, If symptoms worsen              BRANDT Smith  01/31/23 [id number]

## 2023-01-31 NOTE — Clinical Note
"Harley Nelsonrufina"Robert was seen and treated in our emergency department on 1/31/2023.  She may return to work on 02/03/2023.       If you have any questions or concerns, please don't hesitate to call.      BRANDT Smith"

## 2023-01-31 NOTE — DISCHARGE INSTRUCTIONS
Hydrate. Increase vitamin C up to 3000mg per day. Prednisone daily for 5 days (start tomorrow since given dose in ER today). Use inhaler every 4-6 hours for wheezing/tightness. Tylenol and ibuprofen for pain/fever as needed. Take claritin daily. Promethazine dm for cough as needed.

## 2023-04-09 ENCOUNTER — HOSPITAL ENCOUNTER (EMERGENCY)
Facility: HOSPITAL | Age: 28
Discharge: HOME | End: 2023-04-09
Attending: INTERNAL MEDICINE
Payer: MEDICAID

## 2023-04-09 VITALS
HEIGHT: 66 IN | HEART RATE: 86 BPM | WEIGHT: 155 LBS | DIASTOLIC BLOOD PRESSURE: 71 MMHG | BODY MASS INDEX: 24.91 KG/M2 | TEMPERATURE: 98 F | OXYGEN SATURATION: 96 % | SYSTOLIC BLOOD PRESSURE: 145 MMHG | RESPIRATION RATE: 16 BRPM

## 2023-04-09 DIAGNOSIS — S00.83XA TRAUMATIC HEMATOMA OF FOREHEAD, INITIAL ENCOUNTER: ICD-10-CM

## 2023-04-09 LAB — B-HCG SERPL QL: NEGATIVE

## 2023-04-09 PROCEDURE — 99283 EMERGENCY DEPT VISIT LOW MDM: CPT

## 2023-04-09 PROCEDURE — 81025 URINE PREGNANCY TEST: CPT | Performed by: INTERNAL MEDICINE
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
## 2023-07-13 ENCOUNTER — OFFICE VISIT (OUTPATIENT)
Dept: OBSTETRICS AND GYNECOLOGY | Facility: CLINIC | Age: 28
End: 2023-07-13
Payer: MEDICAID

## 2023-07-13 VITALS
DIASTOLIC BLOOD PRESSURE: 82 MMHG | SYSTOLIC BLOOD PRESSURE: 120 MMHG | TEMPERATURE: 98 F | BODY MASS INDEX: 27.52 KG/M2 | HEIGHT: 64 IN | WEIGHT: 161.19 LBS

## 2023-07-13 DIAGNOSIS — A59.01 TRICHOMONAL VAGINITIS: ICD-10-CM

## 2023-07-13 DIAGNOSIS — Z01.411 ABNORMAL GYNECOLOGICAL EXAMINATION: Primary | ICD-10-CM

## 2023-07-13 DIAGNOSIS — Z12.4 SCREENING FOR MALIGNANT NEOPLASM OF THE CERVIX: ICD-10-CM

## 2023-07-13 DIAGNOSIS — N92.0 MENORRHAGIA WITH REGULAR CYCLE: ICD-10-CM

## 2023-07-13 DIAGNOSIS — Z11.3 SCREENING EXAMINATION FOR VENEREAL DISEASE: ICD-10-CM

## 2023-07-13 LAB
BACTERIA HYPHAE, POC: NEGATIVE
GARDNERELLA VAGINALIS: NEGATIVE
OTHER MICROSC. OBSERVATIONS: POSITIVE
POC BACTERIAL VAGINOSIS: NEGATIVE
POC CLUE CELLS: NEGATIVE
TRICHOMONAS, POC: POSITIVE
YEAST WET PREP: NEGATIVE
YEAST, POC: NEGATIVE

## 2023-07-13 PROCEDURE — 3074F SYST BP LT 130 MM HG: CPT | Mod: CPTII,,, | Performed by: NURSE PRACTITIONER

## 2023-07-13 PROCEDURE — 1159F MED LIST DOCD IN RCRD: CPT | Mod: CPTII,,, | Performed by: NURSE PRACTITIONER

## 2023-07-13 PROCEDURE — 3079F PR MOST RECENT DIASTOLIC BLOOD PRESSURE 80-89 MM HG: ICD-10-PCS | Mod: CPTII,,, | Performed by: NURSE PRACTITIONER

## 2023-07-13 PROCEDURE — 1160F PR REVIEW ALL MEDS BY PRESCRIBER/CLIN PHARMACIST DOCUMENTED: ICD-10-PCS | Mod: CPTII,,, | Performed by: NURSE PRACTITIONER

## 2023-07-13 PROCEDURE — 3074F PR MOST RECENT SYSTOLIC BLOOD PRESSURE < 130 MM HG: ICD-10-PCS | Mod: CPTII,,, | Performed by: NURSE PRACTITIONER

## 2023-07-13 PROCEDURE — 3008F BODY MASS INDEX DOCD: CPT | Mod: CPTII,,, | Performed by: NURSE PRACTITIONER

## 2023-07-13 PROCEDURE — 87220 TISSUE EXAM FOR FUNGI: CPT | Mod: QW,,, | Performed by: NURSE PRACTITIONER

## 2023-07-13 PROCEDURE — 1160F RVW MEDS BY RX/DR IN RCRD: CPT | Mod: CPTII,,, | Performed by: NURSE PRACTITIONER

## 2023-07-13 PROCEDURE — 1159F PR MEDICATION LIST DOCUMENTED IN MEDICAL RECORD: ICD-10-PCS | Mod: CPTII,,, | Performed by: NURSE PRACTITIONER

## 2023-07-13 PROCEDURE — 3079F DIAST BP 80-89 MM HG: CPT | Mod: CPTII,,, | Performed by: NURSE PRACTITIONER

## 2023-07-13 PROCEDURE — 87220 POCT KOH: ICD-10-PCS | Mod: QW,,, | Performed by: NURSE PRACTITIONER

## 2023-07-13 PROCEDURE — 99395 PR PREVENTIVE VISIT,EST,18-39: ICD-10-PCS | Mod: ,,, | Performed by: NURSE PRACTITIONER

## 2023-07-13 PROCEDURE — 3008F PR BODY MASS INDEX (BMI) DOCUMENTED: ICD-10-PCS | Mod: CPTII,,, | Performed by: NURSE PRACTITIONER

## 2023-07-13 PROCEDURE — 99395 PREV VISIT EST AGE 18-39: CPT | Mod: ,,, | Performed by: NURSE PRACTITIONER

## 2023-07-13 PROCEDURE — 87210 SMEAR WET MOUNT SALINE/INK: CPT | Mod: QW,,, | Performed by: NURSE PRACTITIONER

## 2023-07-13 PROCEDURE — 87210 POCT WET PREP: ICD-10-PCS | Mod: QW,,, | Performed by: NURSE PRACTITIONER

## 2023-07-13 RX ORDER — METRONIDAZOLE 500 MG/1
500 TABLET ORAL EVERY 12 HOURS
Qty: 14 TABLET | Refills: 0 | Status: SHIPPED | OUTPATIENT
Start: 2023-07-13 | End: 2023-07-20

## 2023-07-18 LAB — PSYCHE PATHOLOGY RESULT: NORMAL

## 2023-07-24 ENCOUNTER — HOSPITAL ENCOUNTER (EMERGENCY)
Facility: HOSPITAL | Age: 28
Discharge: HOME | End: 2023-07-24
Attending: INTERNAL MEDICINE
Payer: MEDICAID

## 2023-07-24 VITALS
HEART RATE: 117 BPM | OXYGEN SATURATION: 97 % | HEIGHT: 64 IN | SYSTOLIC BLOOD PRESSURE: 110 MMHG | RESPIRATION RATE: 18 BRPM | WEIGHT: 156.38 LBS | DIASTOLIC BLOOD PRESSURE: 76 MMHG | TEMPERATURE: 101 F | BODY MASS INDEX: 26.7 KG/M2

## 2023-07-24 DIAGNOSIS — J02.0 STREP PHARYNGITIS: Primary | ICD-10-CM

## 2023-07-24 LAB
SARS-COV-2 RDRP RESP QL NAA+PROBE: NEGATIVE
STREP A PCR (OHS): DETECTED

## 2023-07-24 PROCEDURE — 99284 EMERGENCY DEPT VISIT MOD MDM: CPT

## 2023-07-24 PROCEDURE — 87651 STREP A DNA AMP PROBE: CPT | Performed by: NURSE PRACTITIONER

## 2023-07-24 PROCEDURE — 25000003 PHARM REV CODE 250: Performed by: NURSE PRACTITIONER

## 2023-07-24 PROCEDURE — 87635 SARS-COV-2 COVID-19 AMP PRB: CPT | Performed by: NURSE PRACTITIONER

## 2023-07-24 RX ORDER — AMOXICILLIN 875 MG/1
875 TABLET, FILM COATED ORAL 2 TIMES DAILY
Qty: 20 TABLET | Refills: 0 | Status: SHIPPED | OUTPATIENT
Start: 2023-07-24 | End: 2023-08-03

## 2023-07-24 RX ORDER — PREDNISONE 10 MG/1
10 TABLET ORAL DAILY
Qty: 21 TABLET | Refills: 0 | Status: SHIPPED | OUTPATIENT
Start: 2023-07-24

## 2023-07-24 RX ORDER — ACETAMINOPHEN 500 MG
1000 TABLET ORAL
Status: COMPLETED | OUTPATIENT
Start: 2023-07-24 | End: 2023-07-24

## 2023-07-24 RX ADMIN — ACETAMINOPHEN 1000 MG: 500 TABLET, FILM COATED ORAL at 05:07

## 2023-07-24 NOTE — ED PROVIDER NOTES
Encounter Date: 2023       History     Chief Complaint   Patient presents with    Sore Throat     C/o fever, chills, sore throat, and lack of appetite x2 days. States she has been taking Tylenol and Motrin OTC, last dose of Motrin at 1300 today.     Patient is a 28-year-old female who presents emerged department with complaints of fever chills sore throat with lack of appetite and just feeling generally sick since yesterday.  She states she has been giving Motrin intermittently and states her last dose was at 1 o'clock today.  She also reports generalized headache that is global and does get somewhat better with the Motrin.  She states she is having difficulty swallowing.  She denies any other complaints or associated symptoms.    Review of patient's allergies indicates:  No Known Allergies  Past Medical History:   Diagnosis Date    Anxiety     Asthma     H/O gonorrhea     H/O trichomoniasis     Ovarian cyst     Tobacco user      Past Surgical History:   Procedure Laterality Date     SECTION  2011     Family History   Problem Relation Age of Onset    Hypertension Father     Hypertension Mother     Breast cancer Neg Hx     Colon cancer Neg Hx     Ovarian cancer Neg Hx     Uterine cancer Neg Hx     Cervical cancer Neg Hx      Social History     Tobacco Use    Smoking status: Some Days     Types: Cigarettes     Start date:      Passive exposure: Never    Smokeless tobacco: Never    Tobacco comments:     Starting cessation 1 year ago   Substance Use Topics    Alcohol use: Not Currently    Drug use: Yes     Frequency: 7.0 times per week     Types: Marijuana     Review of Systems   Constitutional:  Negative for activity change, appetite change and fever.   HENT:  Positive for sore throat and trouble swallowing. Negative for congestion and dental problem.    Eyes:  Negative for discharge and itching.   Respiratory:  Negative for apnea, chest tightness and shortness of breath.    Cardiovascular:   Negative for chest pain.   Gastrointestinal:  Negative for nausea.   Endocrine: Negative for cold intolerance and heat intolerance.   Genitourinary:  Negative for dysuria.   Musculoskeletal:  Negative for back pain.   Skin:  Negative for rash.   Neurological:  Positive for headaches. Negative for weakness.   Hematological:  Does not bruise/bleed easily.   Psychiatric/Behavioral:  Negative for agitation.    All other systems reviewed and are negative.    Physical Exam     Initial Vitals [07/24/23 1747]   BP Pulse Resp Temp SpO2   110/76 (!) 152 18 (!) 101.3 °F (38.5 °C) 99 %      MAP       --         Physical Exam    Nursing note and vitals reviewed.  Constitutional: Vital signs are normal. She appears well-developed and well-nourished.  Non-toxic appearance. She does not have a sickly appearance.   HENT:   Head: Normocephalic and atraumatic.   Right Ear: External ear normal.   Left Ear: External ear normal.   Mouth/Throat: Oropharyngeal exudate present.   Moderate to severe erythema to the posterior pharynx with moderate edema and exudates seen.   Eyes: Conjunctivae, EOM and lids are normal. Lids are everted and swept, no foreign bodies found.   Neck: Trachea normal and phonation normal. Neck supple. No thyroid mass and no thyromegaly present.   Normal range of motion.   Full passive range of motion without pain.     Cardiovascular:  Normal rate, regular rhythm, S1 normal, S2 normal, normal heart sounds, intact distal pulses and normal pulses.           Pulmonary/Chest: Breath sounds normal. No respiratory distress.   Abdominal: Abdomen is soft. There is no abdominal tenderness.   Musculoskeletal:         General: No tenderness or edema.      Cervical back: Full passive range of motion without pain, normal range of motion and neck supple.     Lymphadenopathy:     She has no cervical adenopathy.   Neurological: She is alert and oriented to person, place, and time. She has normal strength.   Skin: Skin is warm, dry  and intact. Capillary refill takes less than 2 seconds.   Psychiatric: She has a normal mood and affect. Her speech is normal and behavior is normal. Judgment normal. Cognition and memory are normal.       ED Course   Procedures  Labs Reviewed   STREP GROUP A BY PCR - Abnormal; Notable for the following components:       Result Value    STREP A PCR ([facility]) Detected (*)     All other components within normal limits    Narrative:     The Xpert Xpress Strep A test is a rapid, qualitative in vitro diagnostic test for the detection of Streptococcus pyogenes (Group A ß-hemolytic Streptococcus, Strep A) in throat swab specimens from patients with signs and symptoms of pharyngitis.     SARS-COV-2 RNA AMPLIFICATION, QUAL - Normal    Narrative:     The IDNOW COVID-19 assay is a rapid molecular in vitro diagnostic test utilizing an isothermal nucleic acid amplification technology intended for the qualitative detection of nucleic acid from the SARS-CoV-2 viral RNA in direct nasal, nasopharyngeal or throat swabs from individuals who are suspected of COVID-19 by their healthcare provider.          Imaging Results    None          Medications   acetaminophen tablet 1,000 mg (1,000 mg Oral Given 7/24/23 1756)                       Medical Decision Making  Patient is a 28-year-old female presents emerged department with complaints of sore throat and difficulty swallowing.  She has had fever and felt generally ill since yesterday her day.  She denies any other complaints or associated symptoms.    Problems Addressed:  Strep pharyngitis: acute illness or injury     Details: Oral antibiotics and steroids prescribed to be used on outpatient basis for strep pharyngitis.  Discussed Cepacol throat lozenges for symptoms.  Discussed Tylenol Motrin for fever.  Strict ED return precautions discussed for any changing worsening symptoms.    Amount and/or Complexity of Data Reviewed  External Data Reviewed: labs, radiology and notes.  Labs:  ordered. Decision-making details documented in ED Course.            Clinical Impression:   Final diagnoses:  [J02.0] Strep pharyngitis (Primary)        ED Disposition Condition    Discharge Stable          ED Prescriptions       Medication Sig Dispense Start Date End Date Auth. Provider    predniSONE (DELTASONE) 10 MG tablet Take 1 tablet (10 mg total) by mouth once daily. Take 4 tabs x 3 days, then take 2 tabs x 3 days, then take 1 tab x 3 days. 21 tablet 7/24/2023 -- BRANDT Lane    amoxicillin (AMOXIL) 875 MG tablet Take 1 tablet (875 mg total) by mouth 2 (two) times daily. for 10 days 20 tablet 7/24/2023 8/3/2023 BRANDT Lane          Follow-up Information    None          BRANDT Lane  07/24/23 4441

## 2023-11-12 ENCOUNTER — HOSPITAL ENCOUNTER (EMERGENCY)
Facility: HOSPITAL | Age: 28
Discharge: HOME | End: 2023-11-12
Attending: EMERGENCY MEDICINE
Payer: MEDICAID

## 2023-11-12 VITALS
RESPIRATION RATE: 20 BRPM | DIASTOLIC BLOOD PRESSURE: 77 MMHG | HEART RATE: 96 BPM | BODY MASS INDEX: 26.78 KG/M2 | OXYGEN SATURATION: 98 % | SYSTOLIC BLOOD PRESSURE: 130 MMHG | WEIGHT: 156 LBS

## 2023-11-12 DIAGNOSIS — F43.25 ADJUSTMENT DISORDER WITH MIXED DISTURBANCE OF EMOTIONS AND CONDUCT: ICD-10-CM

## 2023-11-12 DIAGNOSIS — F10.921 ACUTE ALCOHOLIC INTOXICATION WITH DELIRIUM: Primary | ICD-10-CM

## 2023-11-12 DIAGNOSIS — F19.10 POLYSUBSTANCE ABUSE: ICD-10-CM

## 2023-11-12 LAB
ALBUMIN SERPL-MCNC: 4.3 G/DL (ref 3.5–5)
ALBUMIN/GLOB SERPL: 1.3 RATIO (ref 1.1–2)
ALP SERPL-CCNC: 72 UNIT/L (ref 40–150)
ALT SERPL-CCNC: 23 UNIT/L (ref 0–55)
AMPHET UR QL SCN: NEGATIVE
APPEARANCE UR: CLEAR
AST SERPL-CCNC: 31 UNIT/L (ref 5–34)
B-HCG SERPL QL: NEGATIVE
BACTERIA #/AREA URNS AUTO: ABNORMAL /HPF
BARBITURATE SCN PRESENT UR: NEGATIVE
BASOPHILS # BLD AUTO: 0.04 X10(3)/MCL
BASOPHILS NFR BLD AUTO: 0.7 %
BENZODIAZ UR QL SCN: POSITIVE
BILIRUB SERPL-MCNC: 0.5 MG/DL
BILIRUB UR QL STRIP.AUTO: NEGATIVE
BUN SERPL-MCNC: 8 MG/DL (ref 7–18.7)
CALCIUM SERPL-MCNC: 9.2 MG/DL (ref 8.4–10.2)
CANNABINOIDS UR QL SCN: POSITIVE
CHLORIDE SERPL-SCNC: 111 MMOL/L (ref 98–107)
CO2 SERPL-SCNC: 22 MMOL/L (ref 22–29)
COCAINE UR QL SCN: NEGATIVE
COLOR UR AUTO: YELLOW
CREAT SERPL-MCNC: 0.88 MG/DL (ref 0.55–1.02)
EOSINOPHIL # BLD AUTO: 0.08 X10(3)/MCL (ref 0–0.9)
EOSINOPHIL NFR BLD AUTO: 1.3 %
ERYTHROCYTE [DISTWIDTH] IN BLOOD BY AUTOMATED COUNT: 13.4 % (ref 11.5–17)
ETHANOL SERPL-MCNC: 218 MG/DL
FENTANYL UR QL SCN: NEGATIVE
GFR SERPLBLD CREATININE-BSD FMLA CKD-EPI: >60 MLS/MIN/1.73/M2
GLOBULIN SER-MCNC: 3.2 GM/DL (ref 2.4–3.5)
GLUCOSE SERPL-MCNC: 112 MG/DL (ref 74–100)
GLUCOSE UR QL STRIP.AUTO: NEGATIVE
HCT VFR BLD AUTO: 36.9 % (ref 37–47)
HGB BLD-MCNC: 12.1 G/DL (ref 12–16)
IMM GRANULOCYTES # BLD AUTO: 0.02 X10(3)/MCL (ref 0–0.04)
IMM GRANULOCYTES NFR BLD AUTO: 0.3 %
KETONES UR QL STRIP.AUTO: NEGATIVE
LEUKOCYTE ESTERASE UR QL STRIP.AUTO: NEGATIVE
LYMPHOCYTES # BLD AUTO: 2.07 X10(3)/MCL (ref 0.6–4.6)
LYMPHOCYTES NFR BLD AUTO: 34.7 %
MAGNESIUM SERPL-MCNC: 2.2 MG/DL (ref 1.6–2.6)
MCH RBC QN AUTO: 29.9 PG (ref 27–31)
MCHC RBC AUTO-ENTMCNC: 32.8 G/DL (ref 33–36)
MCV RBC AUTO: 91.1 FL (ref 80–94)
MDMA UR QL SCN: NEGATIVE
MONOCYTES # BLD AUTO: 0.46 X10(3)/MCL (ref 0.1–1.3)
MONOCYTES NFR BLD AUTO: 7.7 %
NEUTROPHILS # BLD AUTO: 3.29 X10(3)/MCL (ref 2.1–9.2)
NEUTROPHILS NFR BLD AUTO: 55.3 %
NITRITE UR QL STRIP.AUTO: NEGATIVE
OPIATES UR QL SCN: NEGATIVE
PCP UR QL: NEGATIVE
PH UR STRIP.AUTO: 7 [PH]
PH UR: 7 [PH] (ref 3–11)
PLATELET # BLD AUTO: 168 X10(3)/MCL (ref 130–400)
PMV BLD AUTO: 12.1 FL (ref 7.4–10.4)
POTASSIUM SERPL-SCNC: 2.9 MMOL/L (ref 3.5–5.1)
PROT SERPL-MCNC: 7.5 GM/DL (ref 6.4–8.3)
PROT UR QL STRIP.AUTO: NEGATIVE
RBC # BLD AUTO: 4.05 X10(6)/MCL (ref 4.2–5.4)
RBC #/AREA URNS AUTO: ABNORMAL /HPF
RBC UR QL AUTO: ABNORMAL
SODIUM SERPL-SCNC: 145 MMOL/L (ref 136–145)
SP GR UR STRIP.AUTO: 1.01 (ref 1–1.03)
SQUAMOUS #/AREA URNS AUTO: ABNORMAL /HPF
UROBILINOGEN UR STRIP-ACNC: 0.2
WBC # SPEC AUTO: 5.96 X10(3)/MCL (ref 4.5–11.5)
WBC #/AREA URNS AUTO: ABNORMAL /HPF

## 2023-11-12 PROCEDURE — 96366 THER/PROPH/DIAG IV INF ADDON: CPT

## 2023-11-12 PROCEDURE — 85025 COMPLETE CBC W/AUTO DIFF WBC: CPT | Performed by: EMERGENCY MEDICINE

## 2023-11-12 PROCEDURE — 96375 TX/PRO/DX INJ NEW DRUG ADDON: CPT

## 2023-11-12 PROCEDURE — 81025 URINE PREGNANCY TEST: CPT | Performed by: EMERGENCY MEDICINE

## 2023-11-12 PROCEDURE — 63600175 PHARM REV CODE 636 W HCPCS: Performed by: EMERGENCY MEDICINE

## 2023-11-12 PROCEDURE — 96367 TX/PROPH/DG ADDL SEQ IV INF: CPT

## 2023-11-12 PROCEDURE — 80307 DRUG TEST PRSMV CHEM ANLYZR: CPT | Performed by: EMERGENCY MEDICINE

## 2023-11-12 PROCEDURE — 82077 ASSAY SPEC XCP UR&BREATH IA: CPT | Performed by: EMERGENCY MEDICINE

## 2023-11-12 PROCEDURE — 96376 TX/PRO/DX INJ SAME DRUG ADON: CPT

## 2023-11-12 PROCEDURE — 80053 COMPREHEN METABOLIC PANEL: CPT | Performed by: EMERGENCY MEDICINE

## 2023-11-12 PROCEDURE — 96365 THER/PROPH/DIAG IV INF INIT: CPT

## 2023-11-12 PROCEDURE — 81001 URINALYSIS AUTO W/SCOPE: CPT | Mod: XB | Performed by: EMERGENCY MEDICINE

## 2023-11-12 PROCEDURE — 63600175 PHARM REV CODE 636 W HCPCS

## 2023-11-12 PROCEDURE — 96361 HYDRATE IV INFUSION ADD-ON: CPT

## 2023-11-12 PROCEDURE — 83735 ASSAY OF MAGNESIUM: CPT | Performed by: EMERGENCY MEDICINE

## 2023-11-12 PROCEDURE — 99284 EMERGENCY DEPT VISIT MOD MDM: CPT | Mod: 25

## 2023-11-12 RX ORDER — POTASSIUM CHLORIDE 7.45 MG/ML
10 INJECTION INTRAVENOUS ONCE
Status: COMPLETED | OUTPATIENT
Start: 2023-11-12 | End: 2023-11-12

## 2023-11-12 RX ORDER — MAGNESIUM SULFATE HEPTAHYDRATE 40 MG/ML
2 INJECTION, SOLUTION INTRAVENOUS
Status: COMPLETED | OUTPATIENT
Start: 2023-11-12 | End: 2023-11-12

## 2023-11-12 RX ORDER — DROPERIDOL 2.5 MG/ML
2.5 INJECTION, SOLUTION INTRAMUSCULAR; INTRAVENOUS
Status: COMPLETED | OUTPATIENT
Start: 2023-11-12 | End: 2023-11-12

## 2023-11-12 RX ORDER — ZIPRASIDONE MESYLATE 20 MG/ML
20 INJECTION, POWDER, LYOPHILIZED, FOR SOLUTION INTRAMUSCULAR
Status: COMPLETED | OUTPATIENT
Start: 2023-11-12 | End: 2023-11-12

## 2023-11-12 RX ORDER — ZIPRASIDONE MESYLATE 20 MG/ML
INJECTION, POWDER, LYOPHILIZED, FOR SOLUTION INTRAMUSCULAR
Status: COMPLETED
Start: 2023-11-12 | End: 2023-11-12

## 2023-11-12 RX ADMIN — DROPERIDOL 2.5 MG: 2.5 INJECTION, SOLUTION INTRAMUSCULAR; INTRAVENOUS at 05:11

## 2023-11-12 RX ADMIN — MAGNESIUM SULFATE HEPTAHYDRATE 2 G: 40 INJECTION, SOLUTION INTRAVENOUS at 07:11

## 2023-11-12 RX ADMIN — SODIUM CHLORIDE, POTASSIUM CHLORIDE, SODIUM LACTATE AND CALCIUM CHLORIDE 1000 ML: 600; 310; 30; 20 INJECTION, SOLUTION INTRAVENOUS at 04:11

## 2023-11-12 RX ADMIN — ZIPRASIDONE MESYLATE 20 MG: 20 INJECTION, POWDER, LYOPHILIZED, FOR SOLUTION INTRAMUSCULAR at 04:11

## 2023-11-12 RX ADMIN — POTASSIUM CHLORIDE 10 MEQ: 7.46 INJECTION, SOLUTION INTRAVENOUS at 09:11

## 2023-11-12 RX ADMIN — DROPERIDOL 2.5 MG: 2.5 INJECTION, SOLUTION INTRAMUSCULAR; INTRAVENOUS at 04:11
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
## 2024-03-09 ENCOUNTER — HOSPITAL ENCOUNTER (EMERGENCY)
Facility: HOSPITAL | Age: 29
Discharge: HOME | End: 2024-03-10
Attending: STUDENT IN AN ORGANIZED HEALTH CARE EDUCATION/TRAINING PROGRAM
Payer: MEDICAID

## 2024-03-09 VITALS
BODY MASS INDEX: 28.17 KG/M2 | TEMPERATURE: 98 F | HEART RATE: 69 BPM | DIASTOLIC BLOOD PRESSURE: 87 MMHG | RESPIRATION RATE: 16 BRPM | HEIGHT: 64 IN | WEIGHT: 165 LBS | OXYGEN SATURATION: 100 % | SYSTOLIC BLOOD PRESSURE: 130 MMHG

## 2024-03-09 DIAGNOSIS — F10.10 ALCOHOL ABUSE: Primary | ICD-10-CM

## 2024-03-09 DIAGNOSIS — F13.10 BENZODIAZEPINE ABUSE: ICD-10-CM

## 2024-03-09 LAB
ALBUMIN SERPL-MCNC: 4.2 G/DL (ref 3.5–5)
ALBUMIN/GLOB SERPL: 1.2 RATIO (ref 1.1–2)
ALP SERPL-CCNC: 68 UNIT/L (ref 40–150)
ALT SERPL-CCNC: 12 UNIT/L (ref 0–55)
AMPHET UR QL SCN: NEGATIVE
APAP SERPL-MCNC: <17.4 UG/ML (ref 17.4–30)
APPEARANCE UR: CLEAR
AST SERPL-CCNC: 23 UNIT/L (ref 5–34)
B-HCG SERPL QL: NEGATIVE
BACTERIA #/AREA URNS AUTO: NORMAL /HPF
BARBITURATE SCN PRESENT UR: NEGATIVE
BASOPHILS # BLD AUTO: 0.05 X10(3)/MCL
BASOPHILS NFR BLD AUTO: 1.2 %
BENZODIAZ UR QL SCN: POSITIVE
BILIRUB SERPL-MCNC: 0.3 MG/DL
BILIRUB UR QL STRIP.AUTO: NEGATIVE
BUN SERPL-MCNC: 8 MG/DL (ref 7–18.7)
CALCIUM SERPL-MCNC: 8.9 MG/DL (ref 8.4–10.2)
CANNABINOIDS UR QL SCN: POSITIVE
CHLORIDE SERPL-SCNC: 114 MMOL/L (ref 98–107)
CO2 SERPL-SCNC: 24 MMOL/L (ref 22–29)
COCAINE UR QL SCN: NEGATIVE
COLOR UR AUTO: YELLOW
CREAT SERPL-MCNC: 0.87 MG/DL (ref 0.55–1.02)
EOSINOPHIL # BLD AUTO: 0.15 X10(3)/MCL (ref 0–0.9)
EOSINOPHIL NFR BLD AUTO: 3.6 %
ERYTHROCYTE [DISTWIDTH] IN BLOOD BY AUTOMATED COUNT: 15 % (ref 11.5–17)
ETHANOL SERPL-MCNC: 224 MG/DL
FENTANYL UR QL SCN: NEGATIVE
GFR SERPLBLD CREATININE-BSD FMLA CKD-EPI: >60 MLS/MIN/1.73/M2
GLOBULIN SER-MCNC: 3.5 GM/DL (ref 2.4–3.5)
GLUCOSE SERPL-MCNC: 79 MG/DL (ref 74–100)
GLUCOSE UR QL STRIP.AUTO: NEGATIVE
HCT VFR BLD AUTO: 36.7 % (ref 37–47)
HGB BLD-MCNC: 11.9 G/DL (ref 12–16)
IMM GRANULOCYTES # BLD AUTO: 0.01 X10(3)/MCL (ref 0–0.04)
IMM GRANULOCYTES NFR BLD AUTO: 0.2 %
KETONES UR QL STRIP.AUTO: NEGATIVE
LEUKOCYTE ESTERASE UR QL STRIP.AUTO: NEGATIVE
LYMPHOCYTES # BLD AUTO: 2.31 X10(3)/MCL (ref 0.6–4.6)
LYMPHOCYTES NFR BLD AUTO: 54.7 %
MCH RBC QN AUTO: 28.6 PG (ref 27–31)
MCHC RBC AUTO-ENTMCNC: 32.4 G/DL (ref 33–36)
MCV RBC AUTO: 88.2 FL (ref 80–94)
MDMA UR QL SCN: NEGATIVE
MONOCYTES # BLD AUTO: 0.23 X10(3)/MCL (ref 0.1–1.3)
MONOCYTES NFR BLD AUTO: 5.5 %
NEUTROPHILS # BLD AUTO: 1.47 X10(3)/MCL (ref 2.1–9.2)
NEUTROPHILS NFR BLD AUTO: 34.8 %
NITRITE UR QL STRIP.AUTO: NEGATIVE
OPIATES UR QL SCN: NEGATIVE
PCP UR QL: NEGATIVE
PH UR STRIP.AUTO: 7 [PH]
PH UR: 7 [PH] (ref 3–11)
PLATELET # BLD AUTO: 218 X10(3)/MCL (ref 130–400)
PMV BLD AUTO: 11.8 FL (ref 7.4–10.4)
POTASSIUM SERPL-SCNC: 3.7 MMOL/L (ref 3.5–5.1)
PROT SERPL-MCNC: 7.7 GM/DL (ref 6.4–8.3)
PROT UR QL STRIP.AUTO: NEGATIVE
RBC # BLD AUTO: 4.16 X10(6)/MCL (ref 4.2–5.4)
RBC #/AREA URNS AUTO: NORMAL /HPF
RBC UR QL AUTO: ABNORMAL
SODIUM SERPL-SCNC: 148 MMOL/L (ref 136–145)
SP GR UR STRIP.AUTO: 1.01 (ref 1–1.03)
SPECIFIC GRAVITY, URINE AUTO (.000) (OHS): 1.01 (ref 1–1.03)
SQUAMOUS #/AREA URNS AUTO: NORMAL /HPF
TSH SERPL-ACNC: 1.35 UIU/ML (ref 0.35–4.94)
UROBILINOGEN UR STRIP-ACNC: 0.2
WBC # SPEC AUTO: 4.22 X10(3)/MCL (ref 4.5–11.5)
WBC #/AREA URNS AUTO: NORMAL /HPF

## 2024-03-09 PROCEDURE — 84443 ASSAY THYROID STIM HORMONE: CPT | Performed by: STUDENT IN AN ORGANIZED HEALTH CARE EDUCATION/TRAINING PROGRAM

## 2024-03-09 PROCEDURE — 80053 COMPREHEN METABOLIC PANEL: CPT | Performed by: STUDENT IN AN ORGANIZED HEALTH CARE EDUCATION/TRAINING PROGRAM

## 2024-03-09 PROCEDURE — 82077 ASSAY SPEC XCP UR&BREATH IA: CPT | Performed by: STUDENT IN AN ORGANIZED HEALTH CARE EDUCATION/TRAINING PROGRAM

## 2024-03-09 PROCEDURE — 99284 EMERGENCY DEPT VISIT MOD MDM: CPT | Mod: 25

## 2024-03-09 PROCEDURE — 51702 INSERT TEMP BLADDER CATH: CPT

## 2024-03-09 PROCEDURE — 96360 HYDRATION IV INFUSION INIT: CPT | Mod: 59

## 2024-03-09 PROCEDURE — 81025 URINE PREGNANCY TEST: CPT | Performed by: STUDENT IN AN ORGANIZED HEALTH CARE EDUCATION/TRAINING PROGRAM

## 2024-03-09 PROCEDURE — 85025 COMPLETE CBC W/AUTO DIFF WBC: CPT | Performed by: STUDENT IN AN ORGANIZED HEALTH CARE EDUCATION/TRAINING PROGRAM

## 2024-03-09 PROCEDURE — 81003 URINALYSIS AUTO W/O SCOPE: CPT | Performed by: STUDENT IN AN ORGANIZED HEALTH CARE EDUCATION/TRAINING PROGRAM

## 2024-03-09 PROCEDURE — 80307 DRUG TEST PRSMV CHEM ANLYZR: CPT | Performed by: STUDENT IN AN ORGANIZED HEALTH CARE EDUCATION/TRAINING PROGRAM

## 2024-03-09 PROCEDURE — 63600175 PHARM REV CODE 636 W HCPCS: Performed by: STUDENT IN AN ORGANIZED HEALTH CARE EDUCATION/TRAINING PROGRAM

## 2024-03-09 PROCEDURE — 80143 DRUG ASSAY ACETAMINOPHEN: CPT | Performed by: STUDENT IN AN ORGANIZED HEALTH CARE EDUCATION/TRAINING PROGRAM

## 2024-03-09 RX ADMIN — SODIUM CHLORIDE, POTASSIUM CHLORIDE, SODIUM LACTATE AND CALCIUM CHLORIDE 1000 ML: 600; 310; 30; 20 INJECTION, SOLUTION INTRAVENOUS at 08:03
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
## 2024-04-01 ENCOUNTER — LAB VISIT (OUTPATIENT)
Dept: LAB | Facility: HOSPITAL | Age: 29
End: 2024-04-01
Attending: NURSE PRACTITIONER
Payer: MEDICAID

## 2024-04-01 ENCOUNTER — OFFICE VISIT (OUTPATIENT)
Dept: OBSTETRICS AND GYNECOLOGY | Facility: CLINIC | Age: 29
End: 2024-04-01
Payer: MEDICAID

## 2024-04-01 VITALS
BODY MASS INDEX: 26.23 KG/M2 | DIASTOLIC BLOOD PRESSURE: 66 MMHG | TEMPERATURE: 97 F | HEIGHT: 64 IN | SYSTOLIC BLOOD PRESSURE: 120 MMHG | WEIGHT: 153.63 LBS

## 2024-04-01 DIAGNOSIS — Z11.3 SCREEN FOR STD (SEXUALLY TRANSMITTED DISEASE): Primary | ICD-10-CM

## 2024-04-01 DIAGNOSIS — N89.8 VAGINAL DISCHARGE: ICD-10-CM

## 2024-04-01 DIAGNOSIS — Z11.3 SCREEN FOR STD (SEXUALLY TRANSMITTED DISEASE): ICD-10-CM

## 2024-04-01 LAB
HBV SURFACE AG SERPL QL IA: NEGATIVE
HBV SURFACE AG SERPL QL IA: NORMAL

## 2024-04-01 PROCEDURE — 3074F SYST BP LT 130 MM HG: CPT | Mod: CPTII,,, | Performed by: NURSE PRACTITIONER

## 2024-04-01 PROCEDURE — 87389 HIV-1 AG W/HIV-1&-2 AB AG IA: CPT

## 2024-04-01 PROCEDURE — 86780 TREPONEMA PALLIDUM: CPT

## 2024-04-01 PROCEDURE — 36415 COLL VENOUS BLD VENIPUNCTURE: CPT

## 2024-04-01 PROCEDURE — 86803 HEPATITIS C AB TEST: CPT

## 2024-04-01 PROCEDURE — 3078F DIAST BP <80 MM HG: CPT | Mod: CPTII,,, | Performed by: NURSE PRACTITIONER

## 2024-04-01 PROCEDURE — 87340 HEPATITIS B SURFACE AG IA: CPT

## 2024-04-01 PROCEDURE — 99213 OFFICE O/P EST LOW 20 MIN: CPT | Mod: ,,, | Performed by: NURSE PRACTITIONER

## 2024-04-01 PROCEDURE — 1159F MED LIST DOCD IN RCRD: CPT | Mod: CPTII,,, | Performed by: NURSE PRACTITIONER

## 2024-04-01 PROCEDURE — 3008F BODY MASS INDEX DOCD: CPT | Mod: CPTII,,, | Performed by: NURSE PRACTITIONER

## 2024-04-01 PROCEDURE — 1160F RVW MEDS BY RX/DR IN RCRD: CPT | Mod: CPTII,,, | Performed by: NURSE PRACTITIONER
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
## 2024-04-02 LAB
HCV AB SERPL QL IA: NONREACTIVE
HIV 1+2 AB+HIV1 P24 AG SERPL QL IA: NONREACTIVE
T PALLIDUM AB SER QL: NONREACTIVE

## 2024-04-04 ENCOUNTER — TELEPHONE (OUTPATIENT)
Dept: OBSTETRICS AND GYNECOLOGY | Facility: CLINIC | Age: 29
End: 2024-04-04
Payer: MEDICAID

## 2024-04-04 DIAGNOSIS — A59.9 TRICHIMONIASIS: Primary | ICD-10-CM

## 2024-04-04 RX ORDER — METRONIDAZOLE 500 MG/1
500 TABLET ORAL EVERY 12 HOURS
Qty: 14 TABLET | Refills: 0 | Status: SHIPPED | OUTPATIENT
Start: 2024-04-04 | End: 2024-04-11

## 2024-04-04 NOTE — TELEPHONE ENCOUNTER
Spoke with Pt regarding +TV on MDL swab. Medication (Flagyl 500 mg BID x7 days) per Gricel Hong NP sent to [facility] in [place], [place] partner to be treated no intercourse until 2 wks after both partners be treated. Instructions on medication given. THERESA scheduled in 3-4 wks. Pt voiced understanding and agrees w/plan of care.    STD reported to TRICE Burgos  06/29/1999  A  [facility] [place], [place]

## 2024-04-05 NOTE — PROGRESS NOTES
Attempted to call patient regarding +TV on Gc/Ct/tv done 4/4/24.  RPR, HIV, Hep B, and Hep C results are Negative.  No answer.  No voicemail.  Voice message states the caller is unavailable.

## 2024-05-09 ENCOUNTER — OFFICE VISIT (OUTPATIENT)
Dept: OBSTETRICS AND GYNECOLOGY | Facility: CLINIC | Age: 29
End: 2024-05-09
Payer: MEDICAID

## 2024-05-09 VITALS
BODY MASS INDEX: 25.78 KG/M2 | HEIGHT: 64 IN | WEIGHT: 151 LBS | DIASTOLIC BLOOD PRESSURE: 62 MMHG | SYSTOLIC BLOOD PRESSURE: 116 MMHG

## 2024-05-09 DIAGNOSIS — G40.409: ICD-10-CM

## 2024-05-09 DIAGNOSIS — Z11.3 SCREENING FOR STDS (SEXUALLY TRANSMITTED DISEASES): ICD-10-CM

## 2024-05-09 DIAGNOSIS — A59.9 TRICHIMONIASIS: Primary | ICD-10-CM

## 2024-05-09 PROCEDURE — 3074F SYST BP LT 130 MM HG: CPT | Mod: CPTII,,, | Performed by: NURSE PRACTITIONER

## 2024-05-09 PROCEDURE — 3078F DIAST BP <80 MM HG: CPT | Mod: CPTII,,, | Performed by: NURSE PRACTITIONER

## 2024-05-09 PROCEDURE — 1159F MED LIST DOCD IN RCRD: CPT | Mod: CPTII,,, | Performed by: NURSE PRACTITIONER

## 2024-05-09 PROCEDURE — 1160F RVW MEDS BY RX/DR IN RCRD: CPT | Mod: CPTII,,, | Performed by: NURSE PRACTITIONER

## 2024-05-09 PROCEDURE — 3008F BODY MASS INDEX DOCD: CPT | Mod: CPTII,,, | Performed by: NURSE PRACTITIONER

## 2024-05-09 PROCEDURE — 99213 OFFICE O/P EST LOW 20 MIN: CPT | Mod: ,,, | Performed by: NURSE PRACTITIONER

## 2024-05-09 NOTE — PROGRESS NOTES
Chief Complaint:  Follow-up (Here for THERESA +TV. Treated on 24.)    History of Present Illness:  Harley Jones is a 29 y.o.  who presents with previous diagnosis of TV for a test of cure. Treated with Flagyl 500mg BID x7 days on 24. She completed her antibiotics with no problems. She denies vaginal itching, odor, or discharge. Reports had grand mal seizure day after completing antibiotic while at urgent care, lasting 15 mins. Denies since.     Review of Systems:  General/Constitutional: Chills denies. Fatigue/weakness denies. Fever denies. Night sweats denies. Hot flashes denies  Gastrointestinal: Abdominal pain denies. Blood in stool denies. Constipation denies. Diarrhea denies. Heartburn denies. Nausea denies. Vomiting denies   Genitourinary: Incontinence denies. Blood in urine denies. Frequent urination denies. Urgency denies. Painful urination denies. Nocturia denies   Gynecologic: Irregular menses denies. Heavy bleeding denies. Painful menses denies. Vaginal discharge denies. Vaginal odor denies. Vaginal itching/Irritation denies. Vaginal lesion denies.  Pelvic pain denies. Decreased libido denies. Vulvar lesion denies. Prolapse of genital organs denies. Painful intercourse denies. Postcoital bleeding denies   Psychiatric: Mood lability denies. Depressed mood denies. Suicidal thoughts denies. Anxiety denies. Overwhelmed denies. Appetite normal. Energy level normal     OB History    Para Term  AB Living   2 1 1 0 1 1   SAB IAB Ectopic Multiple Live Births   1 0 0 0 1      # 1 - Date: 11, Sex: Female, Weight: 2.268 kg (5 lb), GA: None, Type: , Low Transverse, Apgar1: None, Apgar5: None, Living: Living, Birth Comments: None    # 2 - Date: , Sex: X, Weight: None, GA: None, Type: Spontaneous , Apgar1: None, Apgar5: None, Living: Fetal Demise, Birth Comments: None      Past Medical History:   Diagnosis Date    Anxiety     Asthma     H/O gonorrhea     H/O  "trichomoniasis     Ovarian cyst     Tobacco user          Current Outpatient Medications:     predniSONE (DELTASONE) 10 MG tablet, Take 1 tablet (10 mg total) by mouth once daily. Take 4 tabs x 3 days, then take 2 tabs x 3 days, then take 1 tab x 3 days. (Patient not taking: Reported on 4/1/2024), Disp: 21 tablet, Rfl: 0      Physical Exam:  /62 (BP Location: Left arm, Patient Position: Sitting)   Ht 5' 4" (1.626 m)   Wt 68.5 kg (151 lb)   LMP 04/29/2024   BMI 25.92 kg/m²     Chaperone present.       Constitutional: General appearance: healthy, well-nourished and well-developed   Psychiatric: Orientation to time, place and person. Normal mood and affect and active, alert   Abdomen: Auscultation/Inspection/Palpation: No tenderness or masses. Soft, nondistended    Female Genitalia:      Vulva: no masses, atrophy or lesions      Bladder/Urethra: no urethral discharge or mass, normal meatus, bladder non-distended.      Vagina: no tenderness, erythema, cystocele, rectocele, abnormal vaginal discharge, or vesicle(s) or ulcers                   Cervix: no discharge or cervical motion tenderness and grossly normal      Uterus: normal size and shape and midline, non-tender, and no uterine prolapse.      Adnexa/Parametria: no parametrial tenderness or mass, no adnexal tenderness or ovarian mass.       Assessment/Plan:  1. Trichimoniasis    2. Screening for STDs (sexually transmitted diseases)    3. Seizure, grand mal         Counseled on safe sex practices including barrier methods such as condoms to protect against STIs.   GC CZ TV  collected for THERESA TV     Keep appt with Neurologist   Precautions     RTC PRN/ANNUAL     This note was transcribed by Ca Maza. There may be transcription errors as a result, however minimal. Effort has been made to ensure accuracy of transcription, but any obvious errors or omissions should be clarified with the author of the document.         "

## 2024-06-24 ENCOUNTER — OFFICE VISIT (OUTPATIENT)
Dept: OBSTETRICS AND GYNECOLOGY | Facility: CLINIC | Age: 29
End: 2024-06-24
Payer: MEDICAID

## 2024-06-24 VITALS
SYSTOLIC BLOOD PRESSURE: 116 MMHG | BODY MASS INDEX: 26.36 KG/M2 | HEIGHT: 64 IN | WEIGHT: 154.38 LBS | DIASTOLIC BLOOD PRESSURE: 70 MMHG

## 2024-06-24 DIAGNOSIS — N89.8 VAGINAL DISCHARGE: Primary | ICD-10-CM

## 2024-06-24 DIAGNOSIS — B96.89 BV (BACTERIAL VAGINOSIS): ICD-10-CM

## 2024-06-24 DIAGNOSIS — N76.0 BV (BACTERIAL VAGINOSIS): ICD-10-CM

## 2024-06-24 DIAGNOSIS — Z72.51 HIGH RISK HETEROSEXUAL BEHAVIOR: ICD-10-CM

## 2024-06-24 LAB
BACTERIA HYPHAE, POC: NORMAL
GARDNERELLA VAGINALIS: ABNORMAL
OTHER MICROSC. OBSERVATIONS: ABNORMAL
POC BACTERIAL VAGINOSIS: POSITIVE
POC CLUE CELLS: POSITIVE
TRICHOMONAS, POC: ABNORMAL
YEAST WET PREP: ABNORMAL
YEAST, POC: NEGATIVE

## 2024-06-24 PROCEDURE — 1159F MED LIST DOCD IN RCRD: CPT | Mod: CPTII,,, | Performed by: NURSE PRACTITIONER

## 2024-06-24 PROCEDURE — 3074F SYST BP LT 130 MM HG: CPT | Mod: CPTII,,, | Performed by: NURSE PRACTITIONER

## 2024-06-24 PROCEDURE — 87591 N.GONORRHOEAE DNA AMP PROB: CPT | Performed by: NURSE PRACTITIONER

## 2024-06-24 PROCEDURE — 87220 TISSUE EXAM FOR FUNGI: CPT | Mod: QW,,, | Performed by: NURSE PRACTITIONER

## 2024-06-24 PROCEDURE — 1160F RVW MEDS BY RX/DR IN RCRD: CPT | Mod: CPTII,,, | Performed by: NURSE PRACTITIONER

## 2024-06-24 PROCEDURE — 3008F BODY MASS INDEX DOCD: CPT | Mod: CPTII,,, | Performed by: NURSE PRACTITIONER

## 2024-06-24 PROCEDURE — 87210 SMEAR WET MOUNT SALINE/INK: CPT | Mod: QW,,, | Performed by: NURSE PRACTITIONER

## 2024-06-24 PROCEDURE — 99213 OFFICE O/P EST LOW 20 MIN: CPT | Mod: ,,, | Performed by: NURSE PRACTITIONER

## 2024-06-24 PROCEDURE — 87661 TRICHOMONAS VAGINALIS AMPLIF: CPT | Performed by: NURSE PRACTITIONER

## 2024-06-24 PROCEDURE — 3078F DIAST BP <80 MM HG: CPT | Mod: CPTII,,, | Performed by: NURSE PRACTITIONER

## 2024-06-24 RX ORDER — METRONIDAZOLE 500 MG/1
500 TABLET ORAL 2 TIMES DAILY
Qty: 14 TABLET | Refills: 0 | Status: SHIPPED | OUTPATIENT
Start: 2024-06-24 | End: 2024-07-01

## 2024-06-24 RX ORDER — ALPRAZOLAM 1 MG/1
0.5 TABLET ORAL 2 TIMES DAILY PRN
COMMUNITY

## 2024-06-24 NOTE — PROGRESS NOTES
Chief Complaint     Vaginal Discharge    HPI:     Patient is a 29 y.o.  presents today with complaints of yellow discharge, itching, burning x3 days following intercourse. States was unfaithful and cheated on partner 4 days ago.  Symptoms present since then.    Gyn History:    Menstrual History   Cycle: Yes (LMP 24)  Menarche Age: 1 years  Flow Duration: 5  Flow: Normal  Interval: 28  Intermenstrual Bleeding: No  Dysmenorrhea: No  [place]  Sexually Active: Yes  Sexual Orientation: heterosexual  Postcoital Bleeding: No  Dyspareunia: No  STI History: Yes  STI Type: Trichomonas, GC  Contraception: No  Menopause  Menopause Age: 1 years  Breast History  Last Breast Imaging Date: No  History of Breast Biopsy: No  Pap History   Last pap date: 23  Result: Normal (NIL)  History of Abnormal Pap: No  HPV Vaccine Completed: No        Past Medical History:   Diagnosis Date    Anxiety     Asthma     H/O gonorrhea     H/O trichomoniasis     Ovarian cyst     Tobacco user        Past Surgical History:   Procedure Laterality Date     SECTION  2011       Family History   Problem Relation Name Age of Onset    Hypertension Father      Hypertension Mother      Breast cancer Neg Hx      Colon cancer Neg Hx      Ovarian cancer Neg Hx      Uterine cancer Neg Hx      Cervical cancer Neg Hx         OB History          2    Para   1    Term   1            AB   1    Living   1         SAB   1    IAB        Ectopic        Multiple        Live Births   1                 Current Outpatient Medications on File Prior to Visit   Medication Sig Dispense Refill    ALPRAZolam (XANAX) 1 MG tablet Take 0.5 mg by mouth 2 (two) times daily as needed for Anxiety.      [DISCONTINUED] predniSONE (DELTASONE) 10 MG tablet Take 1 tablet (10 mg total) by mouth once daily. Take 4 tabs x 3 days, then take 2 tabs x 3 days, then take 1 tab x 3 days. (Patient not taking: Reported on 2024) 21 tablet 0     No  "current facility-administered medications on file prior to visit.       Review of Systems:       Review of Systems   Constitutional:  Negative for chills and fever.   Gastrointestinal:  Negative for abdominal pain, constipation and diarrhea.   Genitourinary:  Positive for vaginal discharge and vaginal pain. Negative for bladder incontinence, decreased libido, dysmenorrhea, dyspareunia, dysuria, flank pain, frequency, genital sores, hematuria, hot flashes, menorrhagia, menstrual problem, pelvic pain, urgency, vaginal bleeding, urinary incontinence, postcoital bleeding, postmenopausal bleeding, vaginal dryness and vaginal odor.        Physical Exam:    /70 (BP Location: Left arm, Patient Position: Sitting)   Ht 5' 4" (1.626 m)   Wt 70 kg (154 lb 6.4 oz)   LMP 05/25/2024 (Approximate)   BMI 26.50 kg/m²     Physical Exam   General Exam:    General Appearance: alert, in no acute distress, normal, well nourished.  Psych:  Orientation: time, place, person.  Mood/Affect: Normal   Abdomen:  - Soft. Non-tender. No rebound tenderness or guardingNo masses. Liver normal. Spleen normal. No hernia palpable.  Pelvis:   - Vulva: Normal   - Perianal skin: Normal   - Urethra: Normal meatus. Q-tip: Not performed   - Vagina: thin Vaginal discharge present: . Cystocele: Absent. Rectocele: Absent   - Cervix: Normal. Cervical motion tenderness Absent   - Uterus:  Mobile. Non-tender.   - Adnexal: Normal      Assessment:   1. Vaginal discharge  -     POCT Wet Prep  -     POCT KOH  -     Trichomonas vaginalis Amplified RNA  -     C.trach/N.gonor AMP RNA    2. BV (bacterial vaginosis)  -     POCT Wet Prep  -     POCT KOH  -     Trichomonas vaginalis Amplified RNA  -     C.trach/N.gonor AMP RNA  -     metroNIDAZOLE (FLAGYL) 500 MG tablet; Take 1 tablet (500 mg total) by mouth 2 (two) times a day. for 7 days  Dispense: 14 tablet; Refill: 0             Plan:   1. Vaginal discharge  - POCT Wet Prep  - POCT KOH  - Trichomonas vaginalis " Amplified RNA  - C.trach/N.gonor AMP RNA    2. BV (bacterial vaginosis)  - POCT Wet Prep  - POCT KOH  - Trichomonas vaginalis Amplified RNA  - C.trach/N.gonor AMP RNA  - metroNIDAZOLE (FLAGYL) 500 MG tablet; Take 1 tablet (500 mg total) by mouth 2 (two) times a day. for 7 days  Dispense: 14 tablet; Refill: 0      Wet prep: clue + whiff   Educated   Rx Flagyl 500mg BID x7 days  AVOID: Scented soaps or shampoos, bubble bath, scented detergents, feminine sprays, douches, powders        Fragrance-free pH neutral soap     Unscented detergents   No douching   Call office if no improvement in 72 hours     GC CZ TV collected. Will call for results     RTC PRN/Annual     This note was transcribed by Ca Maza. There may be transcription errors as a result, however minimal. Effort has been made to ensure accuracy of transcription, but any obvious errors or omissions should be clarified with the author of the document.

## 2024-06-25 LAB
C TRACH RRNA SPEC QL NAA+PROBE: NEGATIVE
N GONORRHOEA RRNA SPEC QL NAA+PROBE: NEGATIVE
SPECIMEN SOURCE: NORMAL
T VAGINALIS RRNA SPEC QL NAA+PROBE: NEGATIVE

## 2024-07-18 ENCOUNTER — LAB VISIT (OUTPATIENT)
Dept: LAB | Facility: HOSPITAL | Age: 29
End: 2024-07-18
Attending: NURSE PRACTITIONER
Payer: MEDICAID

## 2024-07-18 ENCOUNTER — OFFICE VISIT (OUTPATIENT)
Dept: OBSTETRICS AND GYNECOLOGY | Facility: CLINIC | Age: 29
End: 2024-07-18
Payer: MEDICAID

## 2024-07-18 VITALS
WEIGHT: 152.63 LBS | SYSTOLIC BLOOD PRESSURE: 114 MMHG | TEMPERATURE: 97 F | DIASTOLIC BLOOD PRESSURE: 70 MMHG | HEIGHT: 64 IN | BODY MASS INDEX: 26.06 KG/M2

## 2024-07-18 DIAGNOSIS — Z11.3 ENCOUNTER FOR SCREENING FOR INFECTIONS WITH PREDOMINANTLY SEXUAL MODE OF TRANSMISSION: ICD-10-CM

## 2024-07-18 DIAGNOSIS — N94.6 DYSMENORRHEA: ICD-10-CM

## 2024-07-18 DIAGNOSIS — N92.0 MENORRHAGIA WITH REGULAR CYCLE: ICD-10-CM

## 2024-07-18 DIAGNOSIS — Z01.411 ABNORMAL GYNECOLOGICAL EXAMINATION: Primary | ICD-10-CM

## 2024-07-18 DIAGNOSIS — N76.0 BV (BACTERIAL VAGINOSIS): ICD-10-CM

## 2024-07-18 DIAGNOSIS — Z86.19 HISTORY OF TRICHOMONAL VAGINITIS: ICD-10-CM

## 2024-07-18 DIAGNOSIS — B96.89 BV (BACTERIAL VAGINOSIS): ICD-10-CM

## 2024-07-18 DIAGNOSIS — N89.8 VAGINAL DISCHARGE: ICD-10-CM

## 2024-07-18 LAB
GARDNERELLA VAGINALIS: NEGATIVE
HBV SURFACE AG SERPL QL IA: NEGATIVE
HBV SURFACE AG SERPL QL IA: NORMAL
HCV AB SERPL QL IA: NONREACTIVE
HIV 1+2 AB+HIV1 P24 AG SERPL QL IA: NONREACTIVE
OTHER MICROSC. OBSERVATIONS: ABNORMAL
POC BACTERIAL VAGINOSIS: POSITIVE
POC CLUE CELLS: POSITIVE
T PALLIDUM AB SER QL: NONREACTIVE
TRICHOMONAS, POC: NEGATIVE
YEAST WET PREP: NEGATIVE

## 2024-07-18 PROCEDURE — 3074F SYST BP LT 130 MM HG: CPT | Mod: CPTII,,, | Performed by: NURSE PRACTITIONER

## 2024-07-18 PROCEDURE — 86803 HEPATITIS C AB TEST: CPT

## 2024-07-18 PROCEDURE — 87591 N.GONORRHOEAE DNA AMP PROB: CPT | Performed by: NURSE PRACTITIONER

## 2024-07-18 PROCEDURE — 87624 HPV HI-RISK TYP POOLED RSLT: CPT | Performed by: NURSE PRACTITIONER

## 2024-07-18 PROCEDURE — 36415 COLL VENOUS BLD VENIPUNCTURE: CPT

## 2024-07-18 PROCEDURE — 87389 HIV-1 AG W/HIV-1&-2 AB AG IA: CPT

## 2024-07-18 PROCEDURE — 1160F RVW MEDS BY RX/DR IN RCRD: CPT | Mod: CPTII,,, | Performed by: NURSE PRACTITIONER

## 2024-07-18 PROCEDURE — 3078F DIAST BP <80 MM HG: CPT | Mod: CPTII,,, | Performed by: NURSE PRACTITIONER

## 2024-07-18 PROCEDURE — 1159F MED LIST DOCD IN RCRD: CPT | Mod: CPTII,,, | Performed by: NURSE PRACTITIONER

## 2024-07-18 PROCEDURE — 87340 HEPATITIS B SURFACE AG IA: CPT

## 2024-07-18 PROCEDURE — 99395 PREV VISIT EST AGE 18-39: CPT | Mod: ,,, | Performed by: NURSE PRACTITIONER

## 2024-07-18 PROCEDURE — 3008F BODY MASS INDEX DOCD: CPT | Mod: CPTII,,, | Performed by: NURSE PRACTITIONER

## 2024-07-18 PROCEDURE — 87210 SMEAR WET MOUNT SALINE/INK: CPT | Mod: QW,,, | Performed by: NURSE PRACTITIONER

## 2024-07-18 PROCEDURE — 87661 TRICHOMONAS VAGINALIS AMPLIF: CPT | Performed by: NURSE PRACTITIONER

## 2024-07-18 PROCEDURE — 87491 CHLMYD TRACH DNA AMP PROBE: CPT | Performed by: NURSE PRACTITIONER

## 2024-07-18 PROCEDURE — 86780 TREPONEMA PALLIDUM: CPT

## 2024-07-18 RX ORDER — TRANEXAMIC ACID 650 MG/1
1300 TABLET ORAL 3 TIMES DAILY
Qty: 30 TABLET | Refills: 11 | Status: SHIPPED | OUTPATIENT
Start: 2024-07-18 | End: 2024-07-23

## 2024-07-18 RX ORDER — METRONIDAZOLE 500 MG/1
500 TABLET ORAL 2 TIMES DAILY
Qty: 14 TABLET | Refills: 0 | Status: SHIPPED | OUTPATIENT
Start: 2024-07-18 | End: 2024-07-25

## 2024-07-18 NOTE — PROGRESS NOTES
Chief Complaint:   Well Woman     History of Present Illness:  Harley Jones is a 29 y.o. year old  presents for her well woman exam. Currently relying on nothing for birth control. Having regular monthly cycles lasting 7 days with heavy bleeding. Painful. Denies feeling lightheaded or dizzy.     C/o yellow discharge that just started this morning. Denies odor or vaginal itching. Denies change is soap or detergent.    PMH of seizures. Last seizure was a week ago, believed it's from stress. Currently taking Alprazolam.     Great maternal aunt currently with breast cancer. Denies family history of uterine, ovarian, or colon cancer.      Gyn History:  Menstrual History   Cycle: Yes (2024)  Menarche Age: 1 years  Flow Duration: 7  Flow: (!) Heavy  Interval: 28  Intermenstrual Bleeding: No  Dysmenorrhea: Yes  Dysmenorrhea Severity : (!) Severe  [place]  Sexually Active: Yes  Sexual Orientation: heterosexual  Postcoital Bleeding: No  Dyspareunia: No  STI History: Yes  STI Type: Trichomonas  Contraception: No  Menopause  Menopause Age: 1 years  Breast History  Last Breast Imaging Date: No  History of Breast Biopsy: No  Pap History   Last pap date: 23 (+TV -GCCZ)  Result: (!) Abnormal  History of Abnormal Pap: No  HPV Vaccine Completed: No (0/3)      Review of Systems:  General/Constitutional: Chills denies. Fatigue/weakness denies. Fever denies. Night sweats denies. Hot flashes denies    Respiratory: Cough denies. Hemoptysis denies. SOB denies. Sputum production denies. Wheezing denies .   Cardiovascular: Chest pain denies. Dizziness denies. Palpitations denies. Swelling in hands/feet denies.                Breast: Dimpling denies. Breast mass denies. Breast pain/tenderness denies. Nipple discharge denies. Puckering denies.  Gastrointestinal: Abdominal pain denies. Blood in stool denies. Constipation denies. Diarrhea denies. Heartburn denies. Nausea denies. Vomiting denies    Genitourinary:  Incontinence denies. Blood in urine denies. Frequent urination denies. Painful urination denies. Urinary urgency denies. Nocturia denies    Gynecologic: Irregular menses denies. Heavy bleeding ADMITS. Painful menses denies. Vaginal discharge ADMITS. Vaginal odor denies. Vaginal itching denies. Vaginal lesion denies. Pelvic pain denies. Decreased libido denies. Vulvar lesion denies. Prolapse of genital organs denies. Painful intercourse denies. Postcoital bleeding denies    Psychiatric: Depression denies. Anxiety denies.     OB History    Para Term  AB Living   2 1 1 0 1 1   SAB IAB Ectopic Multiple Live Births   1 0 0 0 1      # 1 - Date: 11, Sex: Female, Weight: 2.268 kg (5 lb), GA: None, Type: , Low Transverse, Apgar1: None, Apgar5: None, Living: Living, Birth Comments: None    # 2 - Date: , Sex: X, Weight: None, GA: None, Type: Spontaneous , Apgar1: None, Apgar5: None, Living: Fetal Demise, Birth Comments: None      Past Medical History:   Diagnosis Date    Anxiety     Asthma     H/O gonorrhea     H/O trichomoniasis     Hypertension     Infertility, female     Ovarian cyst     STD (sexually transmitted disease)     Tobacco user        Current Outpatient Medications:     ALPRAZolam (XANAX) 1 MG tablet, Take 0.5 mg by mouth 2 (two) times daily as needed for Anxiety., Disp: , Rfl:     metroNIDAZOLE (FLAGYL) 500 MG tablet, Take 1 tablet (500 mg total) by mouth 2 (two) times a day. for 7 days, Disp: 14 tablet, Rfl: 0    tranexamic acid (LYSTEDA) 650 mg tablet, Take 2 tablets (1,300 mg total) by mouth 3 (three) times daily. for 5 days, Disp: 30 tablet, Rfl: 11    Review of patient's allergies indicates:  No Known Allergies    Past Surgical History:   Procedure Laterality Date     SECTION  2011     Family History   Problem Relation Name Age of Onset    Hypertension Mother Jose Funez     Hypertension Father      Hypertension Brother Jayjay Funez     Breast  "cancer Maternal Aunt Kenny     Stomach cancer Maternal Uncle Daniel     Colon cancer Neg Hx      Ovarian cancer Neg Hx      Uterine cancer Neg Hx       Social History     Socioeconomic History    Marital status: Single   Tobacco Use    Smoking status: Some Days     Types: Cigarettes     Start date: 2011     Passive exposure: Never    Smokeless tobacco: Never    Tobacco comments:     Starting cessation 1 year ago   Substance and Sexual Activity    Alcohol use: Not Currently    Drug use: Yes     Frequency: 7.0 times per week     Types: Benzodiazepines, Marijuana    Sexual activity: Yes     Partners: Male     Birth control/protection: Condom, None     Comment: STI: Trich, Gonorrhea       Physical Exam:  /70 (BP Location: Left arm, Patient Position: Sitting, BP Method: Medium (Manual))   Temp 97.3 °F (36.3 °C) (Temporal)   Ht 5' 4" (1.626 m)   Wt 69.2 kg (152 lb 9.6 oz)   LMP 06/19/2024 (Approximate)   BMI 26.19 kg/m²     Chaperone: present.       General appearance: healthy, well-nourished and well-developed     Psychiatric: Orientation to time, place and person. Normal mood and affect and active, alert     Skin:   Appearance: no rashes or lesions.     Neck:   Neck: supple, FROM, trachea midline. and no masses   Thyroid: no enlargement or nodules and non-tender.       Cardiovascular:   Auscultation: RRR and no murmur.   Peripheral Vascular: no varicosities, LLE edema, RLE edema, calf tenderness, and palpable cord and pedal pulses intact.     Lungs:   Respiratory effort: no intercostal retractions or accessory muscle usage.   Auscultation: no wheezing, rales/crackles, or rhonchi and clear to auscultation.     Breast:   Inspection/Palpation: no tenderness, discrete/distinct masses, skin changes, or abnormal secretions. Nipple appearance normal.     Abdomen:   Auscultation/Inspection/Palpation: no hepatomegaly, splenomegaly, masses, tenderness or CVA tenderness and soft, non-distended bowel sounds preset.  "   Hernia: no palpable hernias.     Female Genitalia:   Vulva: no masses, tenderness or lesions   Bladder/Urethra: no urethral discharge or mass, normal meatus, bladder non-distended.   Vagina: no tenderness, erythema, cystocele, rectocele, or vesicle(s) or ulcers; +scant vaginal discharge  Cervix: no discharge, no cervical lacerations noted or motion tenderness and grossly normal   Uterus: normal size and shape and midline, non-tender, and no uterine prolapse.   Adnexa/Parametria: no parametrial tenderness or mass, no adnexal tenderness or ovarian mass.     Lymph Nodes:   Palpation: non tender submandibular nodes, axillary nodes, or inguinal nodes.     Rectal Exam:   Rectum: normal perianal skin.        Assessment/Plan:  1. Abnormal gynecological examination  Pap gc/cz/tv  Recommend BSE monthly  Discussed recommendations of annual screening after age of 40 with mammogram    Explained that screening is not 100% reliable. Advised patient if she notices any changes to her breast including a lump, mass, dimpling, discharge, rash, or tenderness she should contact us immediately.     Healthy diet, exercise   Multivitamin   Seat belt   Sunscreen use   Safe sex/ STI education   Contraception evaluation: nothing   Gardasil evaluation: 0/3    2. Menorrhagia with regular cycle  Educated  Improvement with Lysteda, refills sent  Bleeding/ER precautions    -     tranexamic acid (LYSTEDA) 650 mg tablet; Take 2 tablets (1,300 mg total) by mouth 3 (three) times daily. for 5 days  Dispense: 30 tablet; Refill: 11    3. Dysmenorrhea  Educated    NSAIDs, heating pad, warm bath    Pain/ER precautions     4. History of trichomonal vaginitis  Discussed the various types of STDs, related symptoms and the potential consequences (including effects on fertility) of STD infections.       Reviewed ways to limit exposure and prevention techniques.       Cultures: gc/cz/tv    Labs: HIV/RPR/Hep Panel     5. Vaginal discharge  6. BV (bacterial  vaginosis)  Educated   Wet prep: clue + whiff   Flagyl 500mg BID x7 days  No douching   Call office if no improvement in 72 hours     AVOID: Scented soaps or shampoos, bubble bath, scented detergents, feminine sprays, douches, powders          Fragrance-free pH neutral soap     Unscented detergents      -     metroNIDAZOLE (FLAGYL) 500 MG tablet; Take 1 tablet (500 mg total) by mouth 2 (two) times a day. for 7 days  Dispense: 14 tablet; Refill: 0    7. Contraceptive management  Discussed with patient contraceptive options including natural family planning, barrier, oral contraceptives, patch, NuvaRing, Depo-Provera, Nexplanon, IUDs, abstinence.       Safe sex education     Discussed with patient that birth control options discussed above do not protect against STDs.    Patient declines          This note was transcribed by Erin Hobbs MA. There may be transcription errors as a result, however minimal. Effort has been made to ensure accuracy of transcription, but any obvious errors or omissions should be clarified with the author of the document.

## 2024-07-22 ENCOUNTER — HOSPITAL ENCOUNTER (EMERGENCY)
Facility: HOSPITAL | Age: 29
Discharge: HOME | End: 2024-07-22
Attending: INTERNAL MEDICINE
Payer: MEDICAID

## 2024-07-22 VITALS
SYSTOLIC BLOOD PRESSURE: 127 MMHG | BODY MASS INDEX: 25.33 KG/M2 | HEART RATE: 80 BPM | RESPIRATION RATE: 16 BRPM | WEIGHT: 152 LBS | OXYGEN SATURATION: 96 % | HEIGHT: 65 IN | TEMPERATURE: 98 F | DIASTOLIC BLOOD PRESSURE: 81 MMHG

## 2024-07-22 DIAGNOSIS — H60.91 OTITIS EXTERNA OF RIGHT EAR, UNSPECIFIED CHRONICITY, UNSPECIFIED TYPE: Primary | ICD-10-CM

## 2024-07-22 PROCEDURE — 99283 EMERGENCY DEPT VISIT LOW MDM: CPT

## 2024-07-22 RX ORDER — CIPROFLOXACIN AND DEXAMETHASONE 3; 1 MG/ML; MG/ML
4 SUSPENSION/ DROPS AURICULAR (OTIC) 2 TIMES DAILY
Qty: 7.5 ML | Refills: 0 | Status: SHIPPED | OUTPATIENT
Start: 2024-07-22 | End: 2024-07-29

## 2024-07-22 NOTE — DISCHARGE INSTRUCTIONS
Antibiotic drops in the affected ear as prescribed. Tylenol/ibuprofen for pain. Do not stick anything in ear. Do not swim while on antibiotics. Return with new or worsening symptoms.

## 2024-07-23 NOTE — ED PROVIDER NOTES
Encounter Date: 2024       History     Chief Complaint   Patient presents with    Otalgia     Right ear pain startede 3 days ago     See MDM.     The history is provided by the patient. No  was used.     Review of patient's allergies indicates:  No Known Allergies  Past Medical History:   Diagnosis Date    Anxiety     Asthma     H/O gonorrhea     H/O trichomoniasis     Hypertension     Infertility, female     Ovarian cyst     STD (sexually transmitted disease)     Tobacco user      Past Surgical History:   Procedure Laterality Date     SECTION  2011     Family History   Problem Relation Name Age of Onset    Hypertension Mother Jose Funez     Hypertension Father      Hypertension Brother Jayjay Funez     Breast cancer Maternal Aunt Kenny     Stomach cancer Maternal Uncle Daniel     Colon cancer Neg Hx      Ovarian cancer Neg Hx      Uterine cancer Neg Hx       Social History     Tobacco Use    Smoking status: Some Days     Types: Cigarettes     Start date:      Passive exposure: Never    Smokeless tobacco: Never    Tobacco comments:     Starting cessation 1 year ago   Substance Use Topics    Alcohol use: Not Currently    Drug use: Yes     Frequency: 7.0 times per week     Types: Benzodiazepines, Marijuana     Review of Systems   Constitutional:  Negative for chills and fever.   HENT:  Positive for ear discharge and ear pain. Negative for congestion, rhinorrhea, sinus pain and sore throat.    Respiratory:  Negative for cough.    All other systems reviewed and are negative.      Physical Exam     Initial Vitals [24 1845]   BP Pulse Resp Temp SpO2   127/81 80 16 97.9 °F (36.6 °C) 96 %      MAP       --         Physical Exam    Nursing note and vitals reviewed.  Constitutional: She appears well-developed and well-nourished. She is not diaphoretic. No distress.   HENT:   Head: Normocephalic and atraumatic.   Right Ear: Tympanic membrane and external ear normal.  There is swelling. No foreign bodies. No mastoid tenderness. Tympanic membrane is not erythematous.   Left Ear: Tympanic membrane, external ear and ear canal normal. No mastoid tenderness.   Pain with tragus manipulation   Eyes: Conjunctivae are normal.   Cardiovascular:  Normal rate.           Pulmonary/Chest: No respiratory distress.           ED Course   Procedures  Labs Reviewed - No data to display       Imaging Results    None          Medications - No data to display  Medical Decision Making  Pt is a 28 y/o female who presents for right ear pain x2 days. States that she had ear pain two days ago and put a q-tip in her ear because she thought that there was wax in the ear that was causing the pain. States that the pain had gotten worse since using the q-tip. Notes that she used hydrogen peroxide in the ear to try and flush it out and had a scab and brown discharge out of the ear. States that her hearing is also decreased. States that there is pain when she presses in front of the ear and pulls on the lobule. Denies fevers, chills, URI sx. Has not been swimming recently.     Edema of right canal, TM intact without purulence or evidence of AOM. Advised patient to avoid swimming for the next week while on drops and to not put anything in the ear to avoid recurrence. Patient expressed understanding and was in agreement with the plan.     Risk  Prescription drug management.      Additional MDM:   Differential Diagnosis:   Other: The following diagnoses were also considered and will be evaluated: AOM, viral uri and mastoiditis.                                   Clinical Impression:  Final diagnoses:  [H60.91] Otitis externa of right ear, unspecified chronicity, unspecified type (Primary)          ED Disposition Condition    Discharge Stable          ED Prescriptions       Medication Sig Dispense Start Date End Date Auth. Provider    ciprofloxacin-dexAMETHasone 0.3-0.1% (CIPRODEX) 0.3-0.1 % DrpS Place 4 drops into  the right ear 2 (two) times daily. for 7 days 7.5 mL 7/22/2024 7/29/2024 Emiliana Benito PA          Follow-up Information       Follow up With Specialties Details Why Contact Info    Jax Reyes MD Family Medicine Call in 1 week  [address] [facility] [id number]  [phone]               Emiliana Benito PA  07/22/24 1913

## 2024-08-12 ENCOUNTER — TELEPHONE (OUTPATIENT)
Dept: OBSTETRICS AND GYNECOLOGY | Facility: CLINIC | Age: 29
End: 2024-08-12

## 2024-08-12 ENCOUNTER — OFFICE VISIT (OUTPATIENT)
Dept: OBSTETRICS AND GYNECOLOGY | Facility: CLINIC | Age: 29
End: 2024-08-12
Payer: MEDICAID

## 2024-08-12 VITALS
TEMPERATURE: 97 F | HEIGHT: 65 IN | SYSTOLIC BLOOD PRESSURE: 122 MMHG | WEIGHT: 148 LBS | BODY MASS INDEX: 24.66 KG/M2 | DIASTOLIC BLOOD PRESSURE: 78 MMHG

## 2024-08-12 DIAGNOSIS — Z11.3 SCREENING EXAMINATION FOR VENEREAL DISEASE: ICD-10-CM

## 2024-08-12 DIAGNOSIS — N76.0 BV (BACTERIAL VAGINOSIS): ICD-10-CM

## 2024-08-12 DIAGNOSIS — B96.89 BV (BACTERIAL VAGINOSIS): ICD-10-CM

## 2024-08-12 DIAGNOSIS — Z72.89 OTHER PROBLEMS RELATED TO LIFESTYLE: ICD-10-CM

## 2024-08-12 DIAGNOSIS — R10.2 PELVIC PAIN: Primary | ICD-10-CM

## 2024-08-12 LAB
BACTERIA HYPHAE, POC: NEGATIVE
BILIRUB UR QL STRIP: NEGATIVE
GARDNERELLA VAGINALIS: NEGATIVE
GLUCOSE UR QL STRIP: NEGATIVE
KETONES UR QL STRIP: NEGATIVE
LEUKOCYTE ESTERASE UR QL STRIP: NEGATIVE
OTHER MICROSC. OBSERVATIONS: ABNORMAL
PH, POC UA: 6
POC BACTERIAL VAGINOSIS: POSITIVE
POC BLOOD, URINE: NEGATIVE
POC CLUE CELLS: POSITIVE
POC NITRATES, URINE: NEGATIVE
PROT UR QL STRIP: NEGATIVE
SP GR UR STRIP: 1.02 (ref 1–1.03)
TRICHOMONAS, POC: NEGATIVE
UROBILINOGEN UR STRIP-ACNC: 0.2 (ref 0.1–1.1)
YEAST WET PREP: NEGATIVE
YEAST, POC: NEGATIVE

## 2024-08-12 PROCEDURE — 87591 N.GONORRHOEAE DNA AMP PROB: CPT | Performed by: NURSE PRACTITIONER

## 2024-08-12 PROCEDURE — 87661 TRICHOMONAS VAGINALIS AMPLIF: CPT | Performed by: NURSE PRACTITIONER

## 2024-08-12 RX ORDER — METRONIDAZOLE 500 MG/1
500 TABLET ORAL EVERY 12 HOURS
Qty: 14 TABLET | Refills: 0 | Status: SHIPPED | OUTPATIENT
Start: 2024-08-12 | End: 2024-08-19
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
## 2024-08-12 NOTE — TELEPHONE ENCOUNTER
Contacted pt regarding message left. Informed pt that medication was sent to [facility] in [place]. Pt stated that she went get medication from [facility]. Pt verbalized understanding.

## 2024-08-12 NOTE — TELEPHONE ENCOUNTER
----- Message from Alessia Herzog sent at 8/12/2024  1:06 PM CDT -----  Regarding: Med request    Who Called:  patient  Best Call Back Number:  [phone]  Additional Information:  had appt this morning- called requesting meds be sent to [facility] in [place] instead of the [facility]

## 2024-08-14 ENCOUNTER — HOSPITAL ENCOUNTER (OUTPATIENT)
Dept: RADIOLOGY | Facility: HOSPITAL | Age: 29
Discharge: HOME | End: 2024-08-14
Attending: NURSE PRACTITIONER
Payer: MEDICAID

## 2024-08-14 DIAGNOSIS — R10.2 PELVIC PAIN: ICD-10-CM

## 2024-08-14 PROCEDURE — 76830 TRANSVAGINAL US NON-OB: CPT | Mod: TC

## 2024-08-14 PROCEDURE — 76856 US EXAM PELVIC COMPLETE: CPT | Mod: TC

## 2024-08-20 ENCOUNTER — TELEPHONE (OUTPATIENT)
Dept: OBSTETRICS AND GYNECOLOGY | Facility: CLINIC | Age: 29
End: 2024-08-20
Payer: MEDICAID

## 2024-08-20 NOTE — TELEPHONE ENCOUNTER
----- Message from Arti Pham sent at 8/20/2024  1:41 PM CDT -----  Regarding: Call Back  Type:  Patient Returning Call    Who Called:  Who Left Message for Patient:  Does the patient know what this is regarding?:  Would the patient rather a call back or a response via [facility]?   Best Call Back Number:[phone]  Additional Information: Saw results on portal. Asking what is the next step for the fibroids and cyst that showed up on Ultrasound.

## 2024-08-21 ENCOUNTER — OFFICE VISIT (OUTPATIENT)
Dept: OBSTETRICS AND GYNECOLOGY | Facility: CLINIC | Age: 29
End: 2024-08-21
Payer: MEDICAID

## 2024-08-21 VITALS
HEIGHT: 65 IN | TEMPERATURE: 98 F | SYSTOLIC BLOOD PRESSURE: 130 MMHG | WEIGHT: 152 LBS | BODY MASS INDEX: 25.33 KG/M2 | DIASTOLIC BLOOD PRESSURE: 92 MMHG

## 2024-08-21 DIAGNOSIS — R10.2 PELVIC PAIN: ICD-10-CM

## 2024-08-21 DIAGNOSIS — N94.9 VAGINAL BURNING: Primary | ICD-10-CM

## 2024-08-21 DIAGNOSIS — N92.6 LATE MENSES: ICD-10-CM

## 2024-08-21 DIAGNOSIS — N83.202 LEFT OVARIAN CYST: ICD-10-CM

## 2024-08-21 DIAGNOSIS — D25.9 UTERINE LEIOMYOMA, UNSPECIFIED LOCATION: ICD-10-CM

## 2024-08-21 DIAGNOSIS — Z30.011 INITIATION OF ORAL CONTRACEPTION: ICD-10-CM

## 2024-08-21 DIAGNOSIS — B37.31 VULVOVAGINAL CANDIDIASIS: ICD-10-CM

## 2024-08-21 DIAGNOSIS — Z11.3 SCREENING EXAMINATION FOR VENEREAL DISEASE: ICD-10-CM

## 2024-08-21 LAB
B-HCG UR QL: NEGATIVE
BILIRUB UR QL STRIP: NEGATIVE
CTP QC/QA: YES
GLUCOSE UR QL STRIP: NEGATIVE
KETONES UR QL STRIP: NEGATIVE
LEUKOCYTE ESTERASE UR QL STRIP: POSITIVE
PH, POC UA: 7
POC BLOOD, URINE: NEGATIVE
POC NITRATES, URINE: NEGATIVE
PROT UR QL STRIP: NEGATIVE
SP GR UR STRIP: 1.01 (ref 1–1.03)
UROBILINOGEN UR STRIP-ACNC: 0.2 (ref 0.1–1.1)

## 2024-08-21 PROCEDURE — 87591 N.GONORRHOEAE DNA AMP PROB: CPT | Performed by: NURSE PRACTITIONER

## 2024-08-21 PROCEDURE — 87661 TRICHOMONAS VAGINALIS AMPLIF: CPT | Performed by: NURSE PRACTITIONER

## 2024-08-21 RX ORDER — TERCONAZOLE 8 MG/G
1 CREAM VAGINAL NIGHTLY
Qty: 20 G | Refills: 0 | Status: SHIPPED | OUTPATIENT
Start: 2024-08-21 | End: 2024-08-24

## 2024-08-21 RX ORDER — NORETHINDRONE ACETATE AND ETHINYL ESTRADIOL 1.5-30(21)
1 KIT ORAL DAILY
Qty: 28 TABLET | Refills: 2 | Status: SHIPPED | OUTPATIENT
Start: 2024-08-21 | End: 2024-09-18
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
## 2024-08-27 ENCOUNTER — LAB VISIT (OUTPATIENT)
Dept: LAB | Facility: HOSPITAL | Age: 29
End: 2024-08-27
Attending: NURSE PRACTITIONER
Payer: MEDICAID

## 2024-08-27 DIAGNOSIS — Z11.3 SCREENING EXAMINATION FOR VENEREAL DISEASE: ICD-10-CM

## 2024-08-27 LAB
HBV SURFACE AG SERPL QL IA: NEGATIVE
HBV SURFACE AG SERPL QL IA: NORMAL
HCV AB SERPL QL IA: NONREACTIVE
HIV 1+2 AB+HIV1 P24 AG SERPL QL IA: NONREACTIVE
T PALLIDUM AB SER QL: NONREACTIVE

## 2024-08-27 PROCEDURE — 86780 TREPONEMA PALLIDUM: CPT

## 2024-08-27 PROCEDURE — 87389 HIV-1 AG W/HIV-1&-2 AB AG IA: CPT

## 2024-08-27 PROCEDURE — 36415 COLL VENOUS BLD VENIPUNCTURE: CPT

## 2024-08-27 PROCEDURE — 86803 HEPATITIS C AB TEST: CPT

## 2024-08-27 PROCEDURE — 87340 HEPATITIS B SURFACE AG IA: CPT

## 2024-08-28 ENCOUNTER — TELEPHONE (OUTPATIENT)
Dept: OBSTETRICS AND GYNECOLOGY | Facility: CLINIC | Age: 29
End: 2024-08-28
Payer: MEDICAID

## 2024-08-28 NOTE — TELEPHONE ENCOUNTER
----- Message from Brylee Guillory sent at 8/28/2024  1:51 PM CDT -----  Regarding: Pt Advice  Contact: Harley  Pt called stating that she would like to speak to a nurse about her cycle. Patient call back number [phone].

## 2024-12-22 ENCOUNTER — HOSPITAL ENCOUNTER (EMERGENCY)
Facility: HOSPITAL | Age: 29
Discharge: HOME | End: 2024-12-22
Attending: EMERGENCY MEDICINE
Payer: COMMERCIAL

## 2024-12-22 VITALS
RESPIRATION RATE: 18 BRPM | SYSTOLIC BLOOD PRESSURE: 118 MMHG | HEIGHT: 66 IN | BODY MASS INDEX: 26.52 KG/M2 | DIASTOLIC BLOOD PRESSURE: 86 MMHG | WEIGHT: 165 LBS | TEMPERATURE: 97 F | HEART RATE: 99 BPM | OXYGEN SATURATION: 98 %

## 2024-12-22 DIAGNOSIS — S00.83XA CONTUSION OF FACE, INITIAL ENCOUNTER: ICD-10-CM
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
## 2024-12-22 LAB — B-HCG UR QL: NEGATIVE

## 2024-12-22 PROCEDURE — 25000003 PHARM REV CODE 250: Performed by: EMERGENCY MEDICINE

## 2024-12-22 PROCEDURE — 81025 URINE PREGNANCY TEST: CPT | Performed by: EMERGENCY MEDICINE

## 2024-12-22 PROCEDURE — 99284 EMERGENCY DEPT VISIT MOD MDM: CPT | Mod: 25

## 2024-12-22 RX ORDER — ACETAMINOPHEN 500 MG
1000 TABLET ORAL
Status: COMPLETED | OUTPATIENT
Start: 2024-12-22 | End: 2024-12-22

## 2024-12-22 RX ADMIN — ACETAMINOPHEN 1000 MG: 500 TABLET, FILM COATED ORAL at 10:12
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
## 2024-12-31 ENCOUNTER — OFFICE VISIT (OUTPATIENT)
Dept: OBSTETRICS AND GYNECOLOGY | Facility: CLINIC | Age: 29
End: 2024-12-31
Payer: MEDICAID

## 2024-12-31 VITALS
HEIGHT: 66 IN | DIASTOLIC BLOOD PRESSURE: 78 MMHG | TEMPERATURE: 97 F | WEIGHT: 146 LBS | SYSTOLIC BLOOD PRESSURE: 110 MMHG | BODY MASS INDEX: 23.46 KG/M2

## 2024-12-31 DIAGNOSIS — B96.89 BACTERIAL VAGINOSIS: ICD-10-CM

## 2024-12-31 DIAGNOSIS — Z11.3 SCREENING EXAMINATION FOR STD (SEXUALLY TRANSMITTED DISEASE): ICD-10-CM

## 2024-12-31 DIAGNOSIS — N76.0 BACTERIAL VAGINOSIS: ICD-10-CM

## 2024-12-31 DIAGNOSIS — R35.0 FREQUENT URINATION: ICD-10-CM

## 2024-12-31 DIAGNOSIS — N89.8 VAGINAL DISCHARGE: Primary | ICD-10-CM

## 2024-12-31 LAB
BACTERIA HYPHAE, POC: POSITIVE
GARDNERELLA VAGINALIS: NEGATIVE
OTHER MICROSC. OBSERVATIONS: ABNORMAL
POC BACTERIAL VAGINOSIS: POSITIVE
POC CLUE CELLS: POSITIVE
TRICHOMONAS, POC: NEGATIVE
YEAST WET PREP: NEGATIVE
YEAST, POC: NEGATIVE

## 2024-12-31 PROCEDURE — 87661 TRICHOMONAS VAGINALIS AMPLIF: CPT

## 2024-12-31 PROCEDURE — 87210 SMEAR WET MOUNT SALINE/INK: CPT | Mod: QW,,,

## 2024-12-31 PROCEDURE — 1159F MED LIST DOCD IN RCRD: CPT | Mod: CPTII,,,

## 2024-12-31 PROCEDURE — 87491 CHLMYD TRACH DNA AMP PROBE: CPT

## 2024-12-31 PROCEDURE — 87220 TISSUE EXAM FOR FUNGI: CPT | Mod: ,,,

## 2024-12-31 PROCEDURE — 1160F RVW MEDS BY RX/DR IN RCRD: CPT | Mod: CPTII,,,

## 2024-12-31 PROCEDURE — 3008F BODY MASS INDEX DOCD: CPT | Mod: CPTII,,,

## 2024-12-31 PROCEDURE — 3078F DIAST BP <80 MM HG: CPT | Mod: CPTII,,,

## 2024-12-31 PROCEDURE — 99213 OFFICE O/P EST LOW 20 MIN: CPT | Mod: ,,,

## 2024-12-31 PROCEDURE — 3074F SYST BP LT 130 MM HG: CPT | Mod: CPTII,,,

## 2024-12-31 RX ORDER — METRONIDAZOLE 500 MG/1
500 TABLET ORAL EVERY 12 HOURS
Qty: 14 TABLET | Refills: 0 | Status: SHIPPED | OUTPATIENT
Start: 2024-12-31 | End: 2025-01-07

## 2024-12-31 NOTE — PROGRESS NOTES
Chief Complaint:  vaginal odor (C/O vaginal tingling , vaginal odor. Pt would like STD testing. )    History of Present Illness:  Patient is a 29 y.o.  presents c/o a 3 history of Vaginal discharge, odor, and tingling sensation. Denies itching. Denies change in soap or detergent. Xi recent antibiotic use. No new sexual partner.  Desires STD testing.        Gyn History:  Menstrual History  Cycle: Yes  Menarche Age: 1 years  Flow Duration: 7  Flow: (!) Heavy  Interval: 28  Intermenstrual Bleeding: No  Dysmenorrhea: Yes  Dysmenorrhea Severity : (!) Severe    Menopause  Menopause Age: 1 years    Pap History  Last pap date: 24  Result: Normal  History of Abnormal Pap: No  HPV Vaccine Completed: Yes  HPV Vaccine Injection Type: 3 Injection Series    [place]  Sexually Active: Yes  Sexual Orientation: heterosexual  Postcoital Bleeding: No  Dyspareunia: No  STI History: Yes  STI Type: Trichomonas, GC  Contraception: No    Breast History  Last Breast Imaging Date: No  History of Breast Biopsy: No      Review of systems:  General/Constitutional: Chills denies. Fatigue/weakness denies. Fever denies. Night sweats denies. Hot flashes denies  Breast: Dimpling denies. Breast mass denies. Breast pain/tenderness denies. Nipple discharge denies. Puckering denies.  Gastrointestinal: Abdominal pain denies. Blood in stool denies. Constipation denies. Diarrhea denies. Heartburn denies. Nausea denies. Vomiting denies   Genitourinary: Incontinence denies. Blood in urine denies. Frequent urination denies. Urgency denies. Painful urination denies. Nocturia denies   Gynecologic: Irregular menses denies. Heavy bleeding denies. Painful menses denies. Vaginal discharge admits. Vaginal odor admits Vaginal itching/Irritation denies. Vaginal lesion denies.  Pelvic pain denies. Decreased libido denies. Vulvar lesion denies. Prolapse of genital organs denies. Painful intercourse denies. Postcoital bleeding denies   Psychiatric:  "Mood lability denies. Depressed mood denies. Suicidal thoughts denies. Anxiety denies. Overwhelmed denies. Appetite normal. Energy level normal.     OB History    Para Term  AB Living   2 1 1 0 1 1   SAB IAB Ectopic Multiple Live Births   1 0 0 0 1      # 1 - Date: 11, Sex: Female, Weight: 2.268 kg (5 lb), GA: None, Type: , Low Transverse, Apgar1: None, Apgar5: None, Living: Living, Birth Comments: None    # 2 - Date: , Sex: X, Weight: None, GA: None, Type: Spontaneous , Apgar1: None, Apgar5: None, Living: Fetal Demise, Birth Comments: None      Past Medical History:   Diagnosis Date    Anxiety     Asthma     H/O gonorrhea     H/O trichomoniasis     Hypertension     Infertility, female     Ovarian cyst     STD (sexually transmitted disease)     Tobacco user        Current Outpatient Medications:     ALPRAZolam (XANAX) 1 MG tablet, Take 0.5 mg by mouth 2 (two) times daily as needed for Anxiety., Disp: , Rfl:     metroNIDAZOLE (FLAGYL) 500 MG tablet, Take 1 tablet (500 mg total) by mouth every 12 (twelve) hours. for 7 days, Disp: 14 tablet, Rfl: 0      Physical Exam:  /78   Temp 96.8 °F (36 °C)   Ht 5' 6" (1.676 m)   Wt 66.2 kg (146 lb)   BMI 23.57 kg/m²     Chaperone present.       Constitutional: General appearance: healthy, well-nourished and well-developed  Psychiatric:  Orientation to time, place and person. Normal mood and affect and active, alert   Abdomen: Auscultation/Inspection/Palpation: No tenderness or masses. Soft, nondistended      Female Genitalia:      Vulva: no masses, atrophy or lesions      Bladder/Urethra: no urethral discharge or mass, normal meatus, bladder non-distended.      Vagina: no tenderness, erythema, cystocele, rectocele, or vesicle(s) or ulcers, +white, creamy, malodorous discharge     Cervix: no discharge or cervical motion tenderness and grossly normal     Wet prep: +whiff and clue    Assessment/Plan:  1. Vaginal discharge  -     " Trichomonas vaginalis Amplified RNA  -     C.trach/N.gonor AMP RNA  -     MDL Sendout Test    2. Frequent urination  -     POCT Urinalysis, Dipstick, Automated, W/O Scope    3. Bacterial vaginosis  -     metroNIDAZOLE (FLAGYL) 500 MG tablet; Take 1 tablet (500 mg total) by mouth every 12 (twelve) hours. for 7 days  Dispense: 14 tablet; Refill: 0    4. Screening examination for STD (sexually transmitted disease)         Educated   One swab with myco, and urea  Aptima swab GC, CZ, TV  Urine dipstick negative.  Wet prep: clue + whiff   Flagyl 500mg BID x7 days  No douching   Call office if no improvement in 72 hours     Discussed the various types of STDs, related symptoms and the potential consequences (including effects on fertility) of STD infections.       Reviewed ways to limit exposure and prevention techniques.       Cultures: gc/cz/tv    Labs: declined    RTC if no improvement or pending results.

## 2025-01-03 LAB
SPECIMEN SOURCE: NORMAL
T VAGINALIS RRNA SPEC QL NAA+PROBE: NEGATIVE

## 2025-01-04 LAB
C TRACH RRNA SPEC QL NAA+PROBE: POSITIVE
N GONORRHOEA RRNA SPEC QL NAA+PROBE: NEGATIVE
SPECIMEN SOURCE: ABNORMAL
SPECIMEN SOURCE: ABNORMAL

## 2025-01-06 ENCOUNTER — TELEPHONE (OUTPATIENT)
Dept: OBSTETRICS AND GYNECOLOGY | Facility: CLINIC | Age: 30
End: 2025-01-06
Payer: MEDICAID

## 2025-01-06 DIAGNOSIS — A49.3 MYCOPLASMA INFECTION: ICD-10-CM

## 2025-01-06 DIAGNOSIS — A74.9 CHLAMYDIA INFECTION: Primary | ICD-10-CM

## 2025-01-06 RX ORDER — MOXIFLOXACIN HYDROCHLORIDE 400 MG/1
400 TABLET ORAL DAILY
Qty: 7 TABLET | Refills: 0 | Status: SHIPPED | OUTPATIENT
Start: 2025-01-06 | End: 2025-01-13

## 2025-01-06 RX ORDER — DOXYCYCLINE 100 MG/1
100 CAPSULE ORAL 2 TIMES DAILY
Qty: 14 CAPSULE | Refills: 0 | Status: SHIPPED | OUTPATIENT
Start: 2025-01-06 | End: 2025-01-13

## 2025-01-06 NOTE — TELEPHONE ENCOUNTER
----- Message from Arti sent at 1/6/2025  9:43 AM CST -----  Regarding: Call Back  Type:  Patient Returning Call    Who Called:  Who Left Message for Patient:  Does the patient know what this is regarding?:  Would the patient rather a call back or a response via [facility]?   Best Call Back Number:[phone]  Additional Information: Pt has questions in regards to results and medication. Asking for partner to be treated as well. Also asking about referral?

## 2025-01-08 ENCOUNTER — HOSPITAL ENCOUNTER (EMERGENCY)
Facility: HOSPITAL | Age: 30
Discharge: HOME | End: 2025-01-08
Attending: INTERNAL MEDICINE
Payer: MEDICAID

## 2025-01-08 VITALS
SYSTOLIC BLOOD PRESSURE: 118 MMHG | HEART RATE: 81 BPM | WEIGHT: 145.81 LBS | TEMPERATURE: 98 F | RESPIRATION RATE: 16 BRPM | BODY MASS INDEX: 24.89 KG/M2 | OXYGEN SATURATION: 98 % | DIASTOLIC BLOOD PRESSURE: 69 MMHG | HEIGHT: 64 IN

## 2025-01-08 DIAGNOSIS — S49.90XA ARM INJURY: Primary | ICD-10-CM

## 2025-01-08 PROCEDURE — 99283 EMERGENCY DEPT VISIT LOW MDM: CPT | Mod: 25

## 2025-01-08 NOTE — Clinical Note
"Harley Jones (Stanesha) was seen and treated in our emergency department on 1/8/2025.  She may return to work on 01/10/2025.       If you have any questions or concerns, please don't hesitate to call.       LPN    "

## 2025-01-09 NOTE — DISCHARGE INSTRUCTIONS
Take Tylenol or ibuprofen as needed for pain or swelling.  Keep your arm elevated to help with the swelling.  Use ice to help with the pain and swelling as well.  Follow up with your primary care doctor if your pain/symptoms do not improve.  If you experience any new or worsening symptoms return to the emergency department.
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
## 2025-02-10 ENCOUNTER — OFFICE VISIT (OUTPATIENT)
Dept: OBSTETRICS AND GYNECOLOGY | Facility: CLINIC | Age: 30
End: 2025-02-10
Payer: MEDICAID

## 2025-02-10 VITALS
SYSTOLIC BLOOD PRESSURE: 128 MMHG | BODY MASS INDEX: 25.68 KG/M2 | WEIGHT: 149.63 LBS | DIASTOLIC BLOOD PRESSURE: 62 MMHG

## 2025-02-10 DIAGNOSIS — A74.9 CHLAMYDIA INFECTION: Primary | ICD-10-CM

## 2025-02-10 DIAGNOSIS — Z11.3 SCREENING EXAMINATION FOR STD (SEXUALLY TRANSMITTED DISEASE): ICD-10-CM

## 2025-02-10 PROCEDURE — 3078F DIAST BP <80 MM HG: CPT | Mod: CPTII,,,

## 2025-02-10 PROCEDURE — 1159F MED LIST DOCD IN RCRD: CPT | Mod: CPTII,,,

## 2025-02-10 PROCEDURE — 87661 TRICHOMONAS VAGINALIS AMPLIF: CPT

## 2025-02-10 PROCEDURE — 87491 CHLMYD TRACH DNA AMP PROBE: CPT

## 2025-02-10 PROCEDURE — 3008F BODY MASS INDEX DOCD: CPT | Mod: CPTII,,,

## 2025-02-10 PROCEDURE — 1160F RVW MEDS BY RX/DR IN RCRD: CPT | Mod: CPTII,,,

## 2025-02-10 PROCEDURE — 3074F SYST BP LT 130 MM HG: CPT | Mod: CPTII,,,

## 2025-02-10 PROCEDURE — 99213 OFFICE O/P EST LOW 20 MIN: CPT | Mod: ,,,

## 2025-02-10 NOTE — PROGRESS NOTES
Chief Complaint:  Follow-up (Pt is here for follow up THERESA, positive CZ.)    History of Present Illness:  Harley Jones is a 30 y.o.  who presents for a test of cure due to a previous diagnosis of CZ. She completed her antibiotics without problems. She denies vaginal itching, odor, or discharge. That partner was also treated and had completed the antibiotics. Pt desires full repeat STD testing.         Gyn History:  Menstrual History  Cycle: Yes (LMP:2025)  Menarche Age: 1 years  Flow Duration: 7  Flow: (!) Heavy  Interval:  (28)  Intermenstrual Bleeding: No  Dysmenorrhea: Yes  Dysmenorrhea Severity : (!) Severe    Menopause  Menopause Age: 1 years    Pap History  Last pap date: 24  Result: Normal  History of Abnormal Pap: No  HPV Vaccine Completed: Yes  HPV Vaccine Injection Type: 3 Injection Series    [place]  Sexually Active: Yes  Sexual Orientation: heterosexual  Postcoital Bleeding: No  Dyspareunia: No  STI History: Yes  STI Type: Trichomonas, GC  Contraception: No    Breast History  Last Breast Imaging Date: No  History of Breast Biopsy: No        Review of Systems:  General/Constitutional: Chills denies. Fatigue/weakness denies. Fever denies. Night sweats denies. Hot flashes denies  Gastrointestinal: Abdominal pain denies. Blood in stool denies. Constipation denies. Diarrhea denies. Heartburn denies. Nausea denies. Vomiting denies   Genitourinary: Incontinence denies. Blood in urine denies. Frequent urination denies. Urgency denies. Painful urination denies. Nocturia denies   Gynecologic: Irregular menses denies. Heavy bleeding denies. Painful menses denies. Vaginal discharge denies. Vaginal odor denies. Vaginal itching/Irritation denies. Vaginal lesion denies.  Pelvic pain denies. Decreased libido denies. Vulvar lesion denies. Prolapse of genital organs denies. Painful intercourse denies. Postcoital bleeding denies     OB History    Para Term  AB Living   2 1 1 0 1 1    SAB IAB Ectopic Multiple Live Births   1 0 0 0 1      # 1 - Date: 11, Sex: Female, Weight: 2.268 kg (5 lb), GA: None, Type: , Low Transverse, Apgar1: None, Apgar5: None, Living: Living, Birth Comments: None    # 2 - Date: , Sex: X, Weight: None, GA: None, Type: Spontaneous , Apgar1: None, Apgar5: None, Living: Fetal Demise, Birth Comments: None      Past Medical History:   Diagnosis Date    Anxiety     Asthma     H/O gonorrhea     H/O trichomoniasis     Hypertension     Infertility, female     Ovarian cyst     Seizures     STD (sexually transmitted disease)     Tobacco user        Current Outpatient Medications:     ALPRAZolam (XANAX) 1 MG tablet, Take 0.5 mg by mouth 2 (two) times daily as needed for Anxiety., Disp: , Rfl:       Physical Exam:  /62 (BP Location: Right arm, Patient Position: Sitting)   Wt 67.9 kg (149 lb 9.6 oz)   LMP 2025 (Approximate)   BMI 25.68 kg/m²     Chaperone present.       Constitutional: General appearance: healthy, well-nourished and well-developed   Psychiatric: Orientation to time, place and person. Normal mood and affect and active, alert   Abdomen: Auscultation/Inspection/Palpation: No tenderness or masses. Soft, nondistended    Female Genitalia:      Vulva: no masses, atrophy or lesions      Bladder/Urethra: no urethral discharge or mass, normal meatus, bladder non-distended.      Vagina: no tenderness, erythema, cystocele, rectocele, abnormal vaginal discharge, or vesicle(s) or ulcers                   Qtip only    Assessment/Plan:  1. Chlamydia infection    2. Screening examination for STD (sexually transmitted disease)  -     HIV 1/2 Ag/Ab (4th Gen); Future; Expected date: 02/10/2025  -     Hepatitis Panel, Acute; Future; Expected date: 02/10/2025  -     SYPHILIS ANTIBODY (WITH REFLEX RPR); Future; Expected date: 02/10/2025       Educated  Safe sex education  STI information     Pelvic rest    STI labs: HIV, Hep panel, RPR  Aptima swab  GC, CZ, TV     RTC pending results or PRN.

## 2025-03-17 ENCOUNTER — LAB VISIT (OUTPATIENT)
Dept: LAB | Facility: HOSPITAL | Age: 30
End: 2025-03-17
Attending: NURSE PRACTITIONER
Payer: MEDICAID

## 2025-03-17 ENCOUNTER — OFFICE VISIT (OUTPATIENT)
Dept: OBSTETRICS AND GYNECOLOGY | Facility: CLINIC | Age: 30
End: 2025-03-17
Payer: MEDICAID

## 2025-03-17 VITALS
TEMPERATURE: 98 F | SYSTOLIC BLOOD PRESSURE: 124 MMHG | WEIGHT: 148.38 LBS | BODY MASS INDEX: 25.33 KG/M2 | DIASTOLIC BLOOD PRESSURE: 64 MMHG | HEIGHT: 64 IN

## 2025-03-17 DIAGNOSIS — N89.8 VAGINAL IRRITATION: Primary | ICD-10-CM

## 2025-03-17 DIAGNOSIS — Z11.3 ENCOUNTER FOR SCREENING FOR INFECTIONS WITH PREDOMINANTLY SEXUAL MODE OF TRANSMISSION: ICD-10-CM

## 2025-03-17 DIAGNOSIS — B37.31 VAGINAL YEAST INFECTION: ICD-10-CM

## 2025-03-17 DIAGNOSIS — A49.3 MYCOPLASMA INFECTION: ICD-10-CM

## 2025-03-17 DIAGNOSIS — Z72.89 OTHER PROBLEMS RELATED TO LIFESTYLE: ICD-10-CM

## 2025-03-17 LAB
BACTERIA HYPHAE, POC: NEGATIVE
BILIRUB UR QL STRIP: NEGATIVE
GARDNERELLA VAGINALIS: NEGATIVE
GLUCOSE UR QL STRIP: NEGATIVE
HBV SURFACE AG SERPL QL IA: NEGATIVE
HBV SURFACE AG SERPL QL IA: NORMAL
KETONES UR QL STRIP: NEGATIVE
LEUKOCYTE ESTERASE UR QL STRIP: POSITIVE
OTHER MICROSC. OBSERVATIONS: ABNORMAL
PH, POC UA: 6.5
POC BACTERIAL VAGINOSIS: NEGATIVE
POC BLOOD, URINE: NEGATIVE
POC CLUE CELLS: NEGATIVE
POC NITRATES, URINE: NEGATIVE
PROT UR QL STRIP: POSITIVE
SP GR UR STRIP: 1.03 (ref 1–1.03)
TRICHOMONAS, POC: NEGATIVE
UROBILINOGEN UR STRIP-ACNC: 0.2 (ref 0.1–1.1)
YEAST WET PREP: POSITIVE
YEAST, POC: POSITIVE

## 2025-03-17 PROCEDURE — 36415 COLL VENOUS BLD VENIPUNCTURE: CPT

## 2025-03-17 PROCEDURE — 87220 TISSUE EXAM FOR FUNGI: CPT | Mod: QW,,, | Performed by: NURSE PRACTITIONER

## 2025-03-17 PROCEDURE — 87389 HIV-1 AG W/HIV-1&-2 AB AG IA: CPT

## 2025-03-17 PROCEDURE — 3074F SYST BP LT 130 MM HG: CPT | Mod: CPTII,,, | Performed by: NURSE PRACTITIONER

## 2025-03-17 PROCEDURE — 1159F MED LIST DOCD IN RCRD: CPT | Mod: CPTII,,, | Performed by: NURSE PRACTITIONER

## 2025-03-17 PROCEDURE — 3008F BODY MASS INDEX DOCD: CPT | Mod: CPTII,,, | Performed by: NURSE PRACTITIONER

## 2025-03-17 PROCEDURE — 86780 TREPONEMA PALLIDUM: CPT

## 2025-03-17 PROCEDURE — 87340 HEPATITIS B SURFACE AG IA: CPT

## 2025-03-17 PROCEDURE — 1160F RVW MEDS BY RX/DR IN RCRD: CPT | Mod: CPTII,,, | Performed by: NURSE PRACTITIONER

## 2025-03-17 PROCEDURE — 81003 URINALYSIS AUTO W/O SCOPE: CPT | Mod: QW,,, | Performed by: NURSE PRACTITIONER

## 2025-03-17 PROCEDURE — 87210 SMEAR WET MOUNT SALINE/INK: CPT | Mod: QW,,, | Performed by: NURSE PRACTITIONER

## 2025-03-17 PROCEDURE — 99213 OFFICE O/P EST LOW 20 MIN: CPT | Mod: ,,, | Performed by: NURSE PRACTITIONER

## 2025-03-17 PROCEDURE — 3078F DIAST BP <80 MM HG: CPT | Mod: CPTII,,, | Performed by: NURSE PRACTITIONER

## 2025-03-17 PROCEDURE — 87522 HEPATITIS C REVRS TRNSCRPJ: CPT

## 2025-03-17 RX ORDER — FLUCONAZOLE 200 MG/1
TABLET ORAL
Qty: 2 TABLET | Refills: 0 | Status: SHIPPED | OUTPATIENT
Start: 2025-03-17

## 2025-03-17 RX ORDER — TERCONAZOLE 8 MG/G
1 CREAM VAGINAL NIGHTLY
Qty: 20 G | Refills: 0 | Status: SHIPPED | OUTPATIENT
Start: 2025-03-17 | End: 2025-03-20

## 2025-03-17 NOTE — PROGRESS NOTES
Chief Complaint:  Vaginal Itching    History of Present Illness:  Patient is a 30 y.o.  presents c/o vaginal itching and burning. Denies change in soap or detergent. No new sexual partner. Desires STI testing.    Previously diagnosed with mycoplasma hominis and chlamydia in 2024. Completed the Doxycycline, did not  the Moxifloxacin from the pharmacy.         Gyn History:  Menstrual History  Cycle: Yes (2025)  Menarche Age: 1 years  Flow Duration: 7  Flow: (!) Heavy  Interval:  (irregular)  Intermenstrual Bleeding: No  Dysmenorrhea: Yes  Dysmenorrhea Severity : Moderate    Menopause  Menopause Age: 1 years    Pap History  Last pap date: 24  Result: Normal  History of Abnormal Pap: No  HPV Vaccine Completed: No (0/3)    [place]  Sexually Active: Yes  Sexual Orientation: heterosexual  Postcoital Bleeding: No  Dyspareunia: No  STI History: Yes  STI Type: GC, Trichomonas  Contraception: No    Breast History  Last Breast Imaging Date: No  History of Breast Biopsy: No      Review of systems:  General/Constitutional: Chills denies. Fatigue/weakness denies. Fever denies. Night sweats denies. Hot flashes denies  Breast: Dimpling denies. Breast mass denies. Breast pain/tenderness denies. Nipple discharge denies. Puckering denies.  Gastrointestinal: Abdominal pain denies. Blood in stool denies. Constipation denies. Diarrhea denies. Heartburn denies. Nausea denies. Vomiting denies   Genitourinary: Incontinence denies. Blood in urine denies. Frequent urination denies. Urgency denies. Painful urination denies. Nocturia denies   Gynecologic: Irregular menses denies. Heavy bleeding denies. Painful menses denies. Vaginal discharge denies. Vaginal odor denies. Vaginal itching/Irritation admits. Vaginal lesion denies.  Pelvic pain denies. Decreased libido denies. Vulvar lesion denies. Prolapse of genital organs denies. Painful intercourse denies. Postcoital bleeding denies   Psychiatric: Mood lability  "denies. Depressed mood denies. Suicidal thoughts denies. Anxiety denies. Overwhelmed denies. Appetite normal. Energy level normal.     OB History    Para Term  AB Living   2 1 1 0 1 1   SAB IAB Ectopic Multiple Live Births   1 0 0 0 1      # 1 - Date: 11, Sex: Female, Weight: 2.268 kg (5 lb), GA: None, Type: , Low Transverse, Apgar1: None, Apgar5: None, Living: Living, Birth Comments: None    # 2 - Date: , Sex: X, Weight: None, GA: None, Type: Spontaneous , Apgar1: None, Apgar5: None, Living: Fetal Demise, Birth Comments: None      Past Medical History:   Diagnosis Date    Anxiety     Asthma     H/O gonorrhea     H/O trichomoniasis     Hypertension     Infertility, female     Ovarian cyst     Seizures     STD (sexually transmitted disease)     Tobacco user      Current Medications[1]      Physical Exam:  /64 (BP Location: Left arm, Patient Position: Sitting)   Temp 97.9 °F (36.6 °C) (Temporal)   Ht 5' 4" (1.626 m)   Wt 67.3 kg (148 lb 6.4 oz)   LMP 2025 (Approximate)   BMI 25.47 kg/m²     Chaperone present.       Constitutional: General appearance: healthy, well-nourished and well-developed  Psychiatric:  Orientation to time, place and person. Normal mood and affect and active, alert   Abdomen: Auscultation/Inspection/Palpation: No tenderness or masses. Soft, nondistended      Female Genitalia:      Vulva: no masses, atrophy or lesions      Bladder/Urethra: no urethral discharge or mass, normal meatus, bladder non-distended.      Vagina: no tenderness, cystocele, rectocele, abnormal vaginal discharge, or vesicle(s) or ulcers; + erythema     Cervix: no discharge or cervical motion tenderness and grossly normal     Uterus: normal size and shape and midline, non-tender, and no uterine prolapse.     Adnexa/Parametria: no parametrial tenderness or mass, no adnexal tenderness or ovarian mass.         Assessment/Plan:  1. Vaginal irritation  2. Vaginal yeast " infection  Wet prep: yeast  Educated   Diflucan 200mg PO q 72hrs x2  Terazol cream  Call office if no improvement in 72 hours   Cultures: gc/cz/tv vaginally and myco/urea on urine      AVOID: Scented soaps or shampoos, bubble bath, scented detergents, feminine sprays, douches, powders          Fragrance-free pH neutral soap     Unscented detergents     3. Encounter for screening for infections with predominantly sexual mode of transmission  Discussed the various types of STDs, related symptoms and the potential consequences (including effects on fertility) of STD infections.       Reviewed ways to limit exposure and prevention techniques.       Cultures: gc/cz/tv    Labs: HIV/RPR/Hep Panel     4. Need for HPV vaccine  Human papilloma virus HPV is associated with anogenital cancer (including cervical, vaginal, vulvar, penile, and anal), oropharyngeal cancer, and genital warts.       The HPV vaccine significantly reduces the incidence of anogenital cancer and genital warts       Safe sex STI precautions     3 injection series: initiation, 2 months, and 6 months           This note was transcribed by Erin Hobbs MA. There may be transcription errors as a result, however minimal. I agree with transcription and every effort has been made to ensure accuracy of transcription, but any obvious errors or omissions should be clarified with the author of the document.                 [1]   Current Outpatient Medications:     ALPRAZolam (XANAX) 1 MG tablet, Take 0.5 mg by mouth 2 (two) times daily as needed for Anxiety., Disp: , Rfl:     fluconazole (DIFLUCAN) 200 MG Tab, 1 po today and repeat in 3 days, Disp: 2 tablet, Rfl: 0    terconazole (TERAZOL 3) 0.8 % vaginal cream, Place 1 applicator vaginally every evening. for 3 days, Disp: 20 g, Rfl: 0

## 2025-03-18 ENCOUNTER — RESULTS FOLLOW-UP (OUTPATIENT)
Dept: OBSTETRICS AND GYNECOLOGY | Facility: CLINIC | Age: 30
End: 2025-03-18
Payer: MEDICAID

## 2025-03-18 DIAGNOSIS — A59.9 TRICHIMONIASIS: Primary | ICD-10-CM

## 2025-03-18 LAB
C TRACH RRNA SPEC QL NAA+PROBE: NEGATIVE
HIV 1+2 AB+HIV1 P24 AG SERPL QL IA: NONREACTIVE
N GONORRHOEA RRNA SPEC QL NAA+PROBE: NEGATIVE
SPECIMEN SOURCE: ABNORMAL
SPECIMEN SOURCE: NORMAL
SPECIMEN SOURCE: NORMAL
T PALLIDUM AB SER QL: NONREACTIVE
T VAGINALIS RRNA SPEC QL NAA+PROBE: POSITIVE

## 2025-03-19 ENCOUNTER — TELEPHONE (OUTPATIENT)
Dept: OBSTETRICS AND GYNECOLOGY | Facility: CLINIC | Age: 30
End: 2025-03-19
Payer: MEDICAID

## 2025-03-19 LAB — HCV RNA SERPL NAA+PROBE-ACNC: NORMAL IU/ML

## 2025-03-19 RX ORDER — METRONIDAZOLE 500 MG/1
500 TABLET ORAL EVERY 12 HOURS
Qty: 14 TABLET | Refills: 0 | Status: SHIPPED | OUTPATIENT
Start: 2025-03-19 | End: 2025-03-26

## 2025-03-19 RX ORDER — METRONIDAZOLE 500 MG/1
500 TABLET ORAL EVERY 12 HOURS
Qty: 14 TABLET | Refills: 0 | Status: SHIPPED | OUTPATIENT
Start: 2025-03-19 | End: 2025-03-19

## 2025-03-19 NOTE — PROGRESS NOTES
Contacted pt per Gricel Hong NP for +TV results. Flagyl 500 mg PO BID x7 days needs to be sent to pharmacy. Agreed for partner  to be treated. Practice rest. THERESA 5 weeks. Pt transferred to front staff for scheduling.  Jameel Burgos  06/29/2000  [facility]  [facility].

## 2025-03-19 NOTE — PROGRESS NOTES
Attempted to contact pt per Gricel Hong NP for +TV results. Flagyl 500 mg PO BID x7 days needs to be sent to pharmacy. Partner needs to ne treated. Practice rest. THERESA 5 weeks. No vm available. Will try to call pt later.

## 2025-03-20 ENCOUNTER — TELEPHONE (OUTPATIENT)
Dept: OBSTETRICS AND GYNECOLOGY | Facility: CLINIC | Age: 30
End: 2025-03-20
Payer: MEDICAID

## 2025-03-20 NOTE — TELEPHONE ENCOUNTER
Contacted pt regarding message left. Pt stated her medication was not at [facility]. Informed pt that it was at [facility]. Pt stated she will go get the medication. Pt verbalized understanding.

## 2025-03-20 NOTE — TELEPHONE ENCOUNTER
----- Message from Brylee sent at 3/20/2025  1:08 PM CDT -----  Regarding: Medication Error  Contact: Harley  Pt called stating that her pharmacy never received her prescription of metroNIDAZOLE (FLAGYL) 500 MG table. [id number] - [place] - [address] [place] [id number]: [phone] Fax: [phone]

## 2025-03-31 ENCOUNTER — HOSPITAL ENCOUNTER (EMERGENCY)
Facility: HOSPITAL | Age: 30
Discharge: HOME | End: 2025-03-31
Attending: INTERNAL MEDICINE
Payer: MEDICAID

## 2025-03-31 VITALS
WEIGHT: 145.19 LBS | SYSTOLIC BLOOD PRESSURE: 128 MMHG | DIASTOLIC BLOOD PRESSURE: 99 MMHG | HEART RATE: 108 BPM | BODY MASS INDEX: 24.92 KG/M2 | RESPIRATION RATE: 19 BRPM | OXYGEN SATURATION: 99 % | TEMPERATURE: 99 F
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
## 2025-03-31 DIAGNOSIS — S61.257A: Primary | ICD-10-CM

## 2025-03-31 PROCEDURE — 90471 IMMUNIZATION ADMIN: CPT | Performed by: NURSE PRACTITIONER

## 2025-03-31 PROCEDURE — 99284 EMERGENCY DEPT VISIT MOD MDM: CPT | Mod: 25

## 2025-03-31 PROCEDURE — 63600175 PHARM REV CODE 636 W HCPCS: Performed by: NURSE PRACTITIONER

## 2025-03-31 PROCEDURE — 90715 TDAP VACCINE 7 YRS/> IM: CPT | Performed by: NURSE PRACTITIONER

## 2025-03-31 RX ORDER — AMOXICILLIN AND CLAVULANATE POTASSIUM 875; 125 MG/1; MG/1
1 TABLET, FILM COATED ORAL 2 TIMES DAILY
Qty: 14 TABLET | Refills: 0 | Status: SHIPPED | OUTPATIENT
Start: 2025-03-31

## 2025-03-31 RX ADMIN — TETANUS TOXOID, REDUCED DIPHTHERIA TOXOID AND ACELLULAR PERTUSSIS VACCINE, ADSORBED 0.5 ML: 5; 2.5; 8; 8; 2.5 SUSPENSION INTRAMUSCULAR at 08:03

## 2025-04-01 ENCOUNTER — HOSPITAL ENCOUNTER (EMERGENCY)
Facility: HOSPITAL | Age: 30
Discharge: HOME | End: 2025-04-01
Attending: EMERGENCY MEDICINE
Payer: MEDICAID

## 2025-04-01 VITALS
HEART RATE: 98 BPM | RESPIRATION RATE: 18 BRPM | OXYGEN SATURATION: 99 % | DIASTOLIC BLOOD PRESSURE: 78 MMHG | TEMPERATURE: 98 F | BODY MASS INDEX: 24.82 KG/M2 | WEIGHT: 144.63 LBS | SYSTOLIC BLOOD PRESSURE: 130 MMHG

## 2025-04-01 DIAGNOSIS — S46.812A TRAPEZIUS STRAIN, LEFT, INITIAL ENCOUNTER: Primary | ICD-10-CM

## 2025-04-01 DIAGNOSIS — S00.83XA CONTUSION OF FACE, INITIAL ENCOUNTER: ICD-10-CM
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
## 2025-04-01 PROCEDURE — 99284 EMERGENCY DEPT VISIT MOD MDM: CPT | Mod: 25

## 2025-04-01 RX ORDER — DICLOFENAC SODIUM 50 MG/1
50 TABLET, DELAYED RELEASE ORAL 3 TIMES DAILY PRN
Qty: 15 TABLET | Refills: 0 | Status: SHIPPED | OUTPATIENT
Start: 2025-04-01 | End: 2025-04-06
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
## 2025-04-01 NOTE — Clinical Note
"Harley Jones (Stanesha) was seen and treated in our emergency department on 4/1/2025.  She may return to work on 04/03/2025.       If you have any questions or concerns, please don't hesitate to call.       RN    "

## 2025-04-02 ENCOUNTER — RESULTS FOLLOW-UP (OUTPATIENT)
Dept: OBSTETRICS AND GYNECOLOGY | Facility: CLINIC | Age: 30
End: 2025-04-02
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
## 2025-04-14 ENCOUNTER — OFFICE VISIT (OUTPATIENT)
Dept: OBSTETRICS AND GYNECOLOGY | Facility: CLINIC | Age: 30
End: 2025-04-14
Payer: MEDICAID

## 2025-04-14 VITALS
TEMPERATURE: 98 F | WEIGHT: 149.19 LBS | DIASTOLIC BLOOD PRESSURE: 74 MMHG | SYSTOLIC BLOOD PRESSURE: 114 MMHG | BODY MASS INDEX: 25.47 KG/M2 | HEIGHT: 64 IN

## 2025-04-14 DIAGNOSIS — N89.8 VAGINAL DISCHARGE: ICD-10-CM

## 2025-04-14 DIAGNOSIS — A59.01 TRICHOMONAL VAGINITIS: Primary | ICD-10-CM

## 2025-04-14 LAB
BACTERIA HYPHAE, POC: NEGATIVE
GARDNERELLA VAGINALIS: NEGATIVE
OTHER MICROSC. OBSERVATIONS: NORMAL
POC BACTERIAL VAGINOSIS: NEGATIVE
POC CLUE CELLS: NEGATIVE
TRICHOMONAS, POC: NEGATIVE
YEAST WET PREP: NEGATIVE
YEAST, POC: NEGATIVE

## 2025-04-14 NOTE — PROGRESS NOTES
Chief Complaint:  Vaginal Discharge    History of Present Illness:  Harley Jones is a 30 y.o.  who presents for a test of cure due to a previous diagnosis of trichomonas. She completed her antibiotics without problems. She c/o a two day history of vaginal discharge, denies odor or itching. Partner was also treated.         Gyn History:  Menstrual History  Cycle: Yes  Menarche Age: 1 years  Flow Duration: 7  Flow: (!) Heavy  Interval:  (irregular)  Intermenstrual Bleeding: No  Dysmenorrhea: Yes  Dysmenorrhea Severity : Moderate    Menopause  Menopause Age: 1 years    Pap History  Last pap date: 24 (NIL -HPV GC CZ TV)  Result: Normal  History of Abnormal Pap: No  HPV Vaccine Completed: No (0/3)    [place]  Sexually Active: Yes  Sexual Orientation: heterosexual  Postcoital Bleeding: No  Dyspareunia: No  STI History: Yes  STI Type: GC, Trichomonas, Chlamydia  Contraception: No    Breast History  Last Breast Imaging Date: No  History of Breast Biopsy: No        Review of Systems:  General/Constitutional: Chills denies. Fatigue/weakness denies. Fever denies. Night sweats denies. Hot flashes denies  Gastrointestinal: Abdominal pain denies. Blood in stool denies. Constipation denies. Diarrhea denies. Heartburn denies. Nausea denies. Vomiting denies   Genitourinary: Incontinence denies. Blood in urine denies. Frequent urination denies. Urgency denies. Painful urination denies. Nocturia denies   Gynecologic: Irregular menses denies. Heavy bleeding denies. Painful menses denies. Vaginal discharge admits. Vaginal odor denies. Vaginal itching/Irritation denies. Vaginal lesion denies.  Pelvic pain denies. Decreased libido denies. Vulvar lesion denies. Prolapse of genital organs denies. Painful intercourse denies. Postcoital bleeding denies     OB History    Para Term  AB Living   2 1 1 0 1 1   SAB IAB Ectopic Multiple Live Births   1 0 0 0 1      # 1 - Date: 11, Sex: Female, Weight: 2.268  "kg (5 lb), GA: None, Type: , Low Transverse, Apgar1: None, Apgar5: None, Living: Living, Birth Comments: None    # 2 - Date: , Sex: X, Weight: None, GA: None, Type: Spontaneous , Apgar1: None, Apgar5: None, Living: Fetal Demise, Birth Comments: None      Past Medical History:   Diagnosis Date    Anxiety     Asthma     H/O gonorrhea     H/O trichomoniasis     Hypertension     Infertility, female     Ovarian cyst     Seizures     STD (sexually transmitted disease)     Tobacco user      Current Medications[1]      Physical Exam:  /74 (BP Location: Right arm, Patient Position: Sitting)   Temp 97.7 °F (36.5 °C)   Ht 5' 4" (1.626 m)   Wt 67.7 kg (149 lb 3.2 oz)   LMP 2025 (Approximate)   BMI 25.61 kg/m²     Chaperone present.       Constitutional: General appearance: healthy, well-nourished and well-developed   Psychiatric: Orientation to time, place and person. Normal mood and affect and active, alert   Abdomen: Auscultation/Inspection/Palpation: No tenderness or masses. Soft, nondistended    Female Genitalia:      Vulva: no masses, atrophy or lesions      Bladder/Urethra: no urethral discharge or mass, normal meatus, bladder non-distended.      Vagina: no tenderness, erythema, cystocele, rectocele, abnormal vaginal discharge, or vesicle(s) or ulcers                   Cervix: no discharge or cervical motion tenderness and grossly normal      Uterus: normal size and shape and midline, non-tender, and no uterine prolapse.      Adnexa/Parametria: no parametrial tenderness or mass, no adnexal tenderness or ovarian mass.       Assessment/Plan:  1. Trichomonal vaginitis  2. Vaginal discharge  Educated  Safe sex education  STI information     Pelvic rest    STI labs: normal HIV, hep panel, RPR    3/17/25 +tv, treated w/ Flagyl 500 mg BID x7 days  THERESA gc/cz/tv  Wet prep and KOH negative      This note was transcribed by Erin Hobbs MA. There may be transcription errors as a result, however " minimal. I agree with transcription and every effort has been made to ensure accuracy of transcription, but any obvious errors or omissions should be clarified with the author of the document.                 [1] No current outpatient medications on file.

## 2025-04-17 ENCOUNTER — RESULTS FOLLOW-UP (OUTPATIENT)
Dept: OBSTETRICS AND GYNECOLOGY | Facility: CLINIC | Age: 30
End: 2025-04-17

## 2025-04-25 ENCOUNTER — HOSPITAL ENCOUNTER (EMERGENCY)
Facility: HOSPITAL | Age: 30
Discharge: HOME | End: 2025-04-25
Attending: FAMILY MEDICINE
Payer: MEDICAID

## 2025-04-25 VITALS
RESPIRATION RATE: 18 BRPM | BODY MASS INDEX: 25.3 KG/M2 | TEMPERATURE: 98 F | SYSTOLIC BLOOD PRESSURE: 123 MMHG | WEIGHT: 147.38 LBS | HEART RATE: 106 BPM | DIASTOLIC BLOOD PRESSURE: 96 MMHG | OXYGEN SATURATION: 96 %

## 2025-04-25 DIAGNOSIS — J06.9 VIRAL URI: Primary | ICD-10-CM

## 2025-04-25 PROCEDURE — 99284 EMERGENCY DEPT VISIT MOD MDM: CPT

## 2025-04-25 RX ORDER — NAPROXEN 500 MG/1
500 TABLET ORAL 2 TIMES DAILY PRN
Qty: 60 TABLET | Refills: 0 | Status: SHIPPED | OUTPATIENT
Start: 2025-04-25

## 2025-04-25 RX ORDER — CETIRIZINE HYDROCHLORIDE 10 MG/1
10 TABLET ORAL DAILY
Qty: 30 TABLET | Refills: 0 | Status: SHIPPED | OUTPATIENT
Start: 2025-04-25 | End: 2026-04-25

## 2025-04-25 RX ORDER — IPRATROPIUM BROMIDE 21 UG/1
2 SPRAY, METERED NASAL 3 TIMES DAILY
Qty: 5 ML | Refills: 0 | Status: SHIPPED | OUTPATIENT
Start: 2025-04-25

## 2025-04-25 RX ORDER — BENZONATATE 100 MG/1
100 CAPSULE ORAL 3 TIMES DAILY PRN
Qty: 30 CAPSULE | Refills: 0 | Status: SHIPPED | OUTPATIENT
Start: 2025-04-25 | End: 2025-05-05

## 2025-04-25 NOTE — ED PROVIDER NOTES
Encounter Date: 2025       History     Chief Complaint   Patient presents with    Cough     Cough, runny nose and sore throat starting yesterday. Taking Nyquil with no relief.      Patient came in for 1 day complaint of cough cold congestion.  Seen at the same time has a daughter with similar symptoms.        Review of patient's allergies indicates:  No Known Allergies  Past Medical History:   Diagnosis Date    Anxiety     Asthma     H/O gonorrhea     H/O trichomoniasis     Hypertension     Infertility, female     Ovarian cyst     Seizures     STD (sexually transmitted disease)     Tobacco user      Past Surgical History:   Procedure Laterality Date     SECTION       Family History   Problem Relation Name Age of Onset    Hypertension Mother Jose Funez     Hypertension Father Mert Jones Jr     Hypertension Brother Jayjay Funez     Breast cancer Maternal Aunt Kenny     Stomach cancer Maternal Uncle Daniel     Colon cancer Neg Hx      Ovarian cancer Neg Hx      Uterine cancer Neg Hx       Social History[1]  Review of Systems   All other systems reviewed and are negative.      Physical Exam     Initial Vitals [25 0106]   BP Pulse Resp Temp SpO2   (!) 123/96 106 18 98.3 °F (36.8 °C) 96 %      MAP       --         Physical Exam    Nursing note and vitals reviewed.  Constitutional: She appears well-developed and well-nourished.   HENT:   Head: Normocephalic and atraumatic.   Neck: Neck supple.   Pulmonary/Chest: No respiratory distress.   Abdominal: Abdomen is soft.   Musculoskeletal:      Cervical back: Neck supple.     Neurological: She is alert and oriented to person, place, and time.   Skin: Skin is warm.   Psychiatric: She has a normal mood and affect. Thought content normal.         ED Course   Procedures  Labs Reviewed - No data to display       Imaging Results    None          Medications - No data to display  Medical Decision Making                                    Clinical  Impression:  Final diagnoses:  [J06.9] Viral URI (Primary)          ED Disposition Condition    Discharge Good          ED Prescriptions       Medication Sig Dispense Start Date End Date Auth. Provider    naproxen (NAPROSYN) 500 MG tablet Take 1 tablet (500 mg total) by mouth 2 (two) times daily as needed (pain). 60 tablet 4/25/2025 -- Michael Gutierrez Jr., MD    cetirizine (ZYRTEC) 10 MG tablet Take 1 tablet (10 mg total) by mouth once daily. 30 tablet 4/25/2025 4/25/2026 Michael Gutierrez Jr., MD    benzonatate (TESSALON) 100 MG capsule Take 1 capsule (100 mg total) by mouth 3 (three) times daily as needed for Cough. 30 capsule 4/25/2025 5/5/2025 Michael Gutierrez Jr., MD    ipratropium (ATROVENT) 21 mcg (0.03 %) nasal spray 2 sprays by Each Nostril route 3 (three) times daily. 5 mL 4/25/2025 -- Michael Gutierrez Jr., MD          Follow-up Information       Follow up With Specialties Details Why Contact Info    Jax Reyes MD Family Medicine   [address] [place] [address] [id number] [phone]                 [1]   Social History  Tobacco Use    Smoking status: Some Days     Types: Cigarettes     Start date: 2011     Passive exposure: Never    Smokeless tobacco: Never    Tobacco comments:     Starting cessation 1 year ago   Substance Use Topics    Alcohol use: Yes     Comment: occasionally    Drug use: Yes     Frequency: 7.0 times per week     Types: Benzodiazepines, Marijuana        Michael Gutierrez Jr., MD  04/25/25 8335

## 2025-04-25 NOTE — ED PROVIDER NOTES
"Encounter Date: 2025       History   No chief complaint on file.    One day complaint of viral URI symptoms.  Cough cold congestion.  No other complaints.          Review of patient's allergies indicates:  No Known Allergies  Past Medical History:   Diagnosis Date    Anxiety     Asthma     H/O gonorrhea     H/O trichomoniasis     Hypertension     Infertility, female     Ovarian cyst     Seizures     STD (sexually transmitted disease)     Tobacco user      Past Surgical History:   Procedure Laterality Date     SECTION       Family History   Problem Relation Name Age of Onset    Hypertension Mother Jose Funez     Hypertension Father Mert Jones Jr     Hypertension Brother Jayjay Funez     Breast cancer Maternal Aunt Kenny     Stomach cancer Maternal Uncle Daniel     Colon cancer Neg Hx      Ovarian cancer Neg Hx      Uterine cancer Neg Hx       Social History[1]  Review of Systems   All other systems reviewed and are negative.      Physical Exam     Initial Vitals   BP Pulse Resp Temp SpO2   -- -- -- -- --      MAP       --         Physical Exam    Nursing note and vitals reviewed.  Constitutional: She appears well-developed and well-nourished.   HENT:   Head: Normocephalic and atraumatic.   Neck: Neck supple.   Cardiovascular:  Normal rate and regular rhythm.           Pulmonary/Chest: Breath sounds normal.   Abdominal: Abdomen is soft.   Musculoskeletal:      Cervical back: Neck supple.     Neurological: She is alert and oriented to person, place, and time.   Skin: Skin is warm.   Psychiatric: She has a normal mood and affect. Thought content normal.         ED Course   Procedures  Labs Reviewed - No data to display       Imaging Results    None          Medications - No data to display  Medical Decision Making                                    Clinical Impression:   ***Please document a Clinical Impression and click the "Refresh" button to refresh your note and " automatically pull in before signing.***                  [1]  Social History  Tobacco Use    Smoking status: Some Days     Types: Cigarettes     Start date: 2011     Passive exposure: Never    Smokeless tobacco: Never    Tobacco comments:     Starting cessation 1 year ago   Substance Use Topics    Alcohol use: Yes     Comment: occasionally    Drug use: Yes     Frequency: 7.0 times per week     Types: Benzodiazepines, Marijuana

## 2025-05-29 ENCOUNTER — RESULTS FOLLOW-UP (OUTPATIENT)
Dept: OBSTETRICS AND GYNECOLOGY | Facility: CLINIC | Age: 30
End: 2025-05-29

## 2025-05-29 ENCOUNTER — LAB VISIT (OUTPATIENT)
Dept: LAB | Facility: HOSPITAL | Age: 30
End: 2025-05-29
Attending: NURSE PRACTITIONER
Payer: MEDICAID

## 2025-05-29 ENCOUNTER — OFFICE VISIT (OUTPATIENT)
Dept: OBSTETRICS AND GYNECOLOGY | Facility: CLINIC | Age: 30
End: 2025-05-29
Payer: MEDICAID

## 2025-05-29 VITALS
BODY MASS INDEX: 25.61 KG/M2 | WEIGHT: 150 LBS | SYSTOLIC BLOOD PRESSURE: 124 MMHG | HEIGHT: 64 IN | DIASTOLIC BLOOD PRESSURE: 80 MMHG

## 2025-05-29 DIAGNOSIS — B96.89 BV (BACTERIAL VAGINOSIS): ICD-10-CM

## 2025-05-29 DIAGNOSIS — B37.31 YEAST VAGINITIS: ICD-10-CM

## 2025-05-29 DIAGNOSIS — Z11.3 SCREENING FOR STD (SEXUALLY TRANSMITTED DISEASE): ICD-10-CM

## 2025-05-29 DIAGNOSIS — N89.8 VAGINAL DISCHARGE: Primary | ICD-10-CM

## 2025-05-29 DIAGNOSIS — N76.0 BV (BACTERIAL VAGINOSIS): ICD-10-CM

## 2025-05-29 LAB
BACTERIA HYPHAE, POC: POSITIVE
GARDNERELLA VAGINALIS: NEGATIVE
HBV SURFACE AG SERPL QL IA: NEGATIVE
HBV SURFACE AG SERPL QL IA: NORMAL
HCV AB SERPL QL IA: NONREACTIVE
HIV 1+2 AB+HIV1 P24 AG SERPL QL IA: NONREACTIVE
OTHER MICROSC. OBSERVATIONS: ABNORMAL
POC BACTERIAL VAGINOSIS: POSITIVE
POC CLUE CELLS: POSITIVE
T PALLIDUM AB SER QL: NONREACTIVE
TRICHOMONAS, POC: NEGATIVE
YEAST WET PREP: POSITIVE
YEAST, POC: POSITIVE

## 2025-05-29 PROCEDURE — 87389 HIV-1 AG W/HIV-1&-2 AB AG IA: CPT

## 2025-05-29 PROCEDURE — 86803 HEPATITIS C AB TEST: CPT

## 2025-05-29 PROCEDURE — 86780 TREPONEMA PALLIDUM: CPT

## 2025-05-29 PROCEDURE — 36415 COLL VENOUS BLD VENIPUNCTURE: CPT

## 2025-05-29 PROCEDURE — 87340 HEPATITIS B SURFACE AG IA: CPT

## 2025-05-29 RX ORDER — CLOTRIMAZOLE AND BETAMETHASONE DIPROPIONATE 10; .64 MG/G; MG/G
CREAM TOPICAL 2 TIMES DAILY
Qty: 15 G | Refills: 0 | Status: SHIPPED | OUTPATIENT
Start: 2025-05-29

## 2025-05-29 RX ORDER — METRONIDAZOLE 500 MG/1
500 TABLET ORAL 2 TIMES DAILY
Qty: 14 TABLET | Refills: 0 | Status: SHIPPED | OUTPATIENT
Start: 2025-05-29 | End: 2025-06-05

## 2025-05-29 RX ORDER — FLUCONAZOLE 200 MG/1
TABLET ORAL
Qty: 2 TABLET | Refills: 0 | Status: SHIPPED | OUTPATIENT
Start: 2025-05-29

## 2025-05-29 NOTE — PROGRESS NOTES
Chief Complaint     Vaginal Discharge    HPI:     Patient is a 30 y.o.  presents today with complaints of vaginal discharge, irritation. Requesting STD testing.     Gyn History:    Menstrual History  Cycle: Yes (2025)  Menarche Age: 1 years  Flow Duration: 7  Flow: (!) Heavy  Interval: 28  Intermenstrual Bleeding: No  Dysmenorrhea: Yes  Dysmenorrhea Severity : Mild    Menopause  Menopause Age: 1 years    Pap History  Last pap date: 24  Result: Normal  History of Abnormal Pap: No  HPV Vaccine Completed: No (0/3)    [place]  Sexually Active: Yes  Sexual Orientation: heterosexual  Postcoital Bleeding: No  Dyspareunia: No  STI History: Yes  STI Type: GC, Trichomonas  Contraception: No    Breast History  Last Breast Imaging Date: No  History of Breast Biopsy: No          Past Medical History:   Diagnosis Date    Anxiety     Asthma     H/O gonorrhea     H/O trichomoniasis     Hypertension     Infertility, female     Ovarian cyst     Seizures     STD (sexually transmitted disease)     Tobacco user        Past Surgical History:   Procedure Laterality Date     SECTION         Family History   Problem Relation Name Age of Onset    Hypertension Mother Jose Funez     Hypertension Father Mert Jones Jr     Hypertension Brother Jayjay Funez     Breast cancer Maternal Aunt Kenny     Stomach cancer Maternal Uncle Daniel     Colon cancer Neg Hx      Ovarian cancer Neg Hx      Uterine cancer Neg Hx         OB History          2    Para   1    Term   1            AB   1    Living   1         SAB   1    IAB        Ectopic        Multiple        Live Births   1                 Medications Ordered Prior to Encounter[1]    Review of Systems:       Review of Systems   Constitutional:  Negative for chills and fever.   Gastrointestinal:  Negative for abdominal pain, constipation and diarrhea.   Genitourinary:  Negative for bladder incontinence, decreased libido, dysmenorrhea,  "dyspareunia, dysuria, flank pain, frequency, genital sores, hematuria, hot flashes, menorrhagia, menstrual problem, pelvic pain, urgency, vaginal bleeding, vaginal discharge, vaginal pain, urinary incontinence, postcoital bleeding, postmenopausal bleeding, vaginal dryness and vaginal odor.        Physical Exam:    /80 (BP Location: Left arm, Patient Position: Sitting)   Ht 5' 4" (1.626 m)   Wt 68 kg (150 lb)   LMP 04/16/2025 (Exact Date)   BMI 25.75 kg/m²     Physical Exam   General Exam:    General Appearance: alert, in no acute distress, normal, well nourished.  Psych:  Orientation: time, place, person.  Mood/Affect: Normal   Abdomen:  - Soft. Non-tender. No rebound tenderness or guardingNo masses. Liver normal. Spleen normal. No hernia palpable.  Pelvis:   - Vulva: Normal   - Perianal skin: Normal   - Urethra: Normal meatus. Q-tip: Not performed   - Vagina: NormalVaginal discharge present: . Cystocele: Absent. Rectocele: Absent   - Cervix: Normal. Cervical motion tenderness Absent   - Uterus: Mobile. Non-tender.   - Adnexal: Normal      Assessment:   1. Vaginal discharge  -     POCT Wet Prep  -     POCT KOH    2. Screening for STD (sexually transmitted disease)  -     Hepatitis B Surface Antigen; Future; Expected date: 05/29/2025  -     HIV 1/2 Ag/Ab (4th Gen); Future; Expected date: 05/29/2025  -     SYPHILIS ANTIBODY (WITH REFLEX RPR); Future; Expected date: 05/29/2025  -     Hepatitis C Antibody; Future; Expected date: 05/29/2025    3. Yeast vaginitis  -     fluconazole (DIFLUCAN) 200 MG Tab; 1 tablet today and repeat in 3 days  Dispense: 2 tablet; Refill: 0  -     clotrimazole-betamethasone 1-0.05% (LOTRISONE) cream; Apply topically 2 (two) times daily.  Dispense: 15 g; Refill: 0    4. BV (bacterial vaginosis)  -     metroNIDAZOLE (FLAGYL) 500 MG tablet; Take 1 tablet (500 mg total) by mouth 2 (two) times a day. for 7 days  Dispense: 14 tablet; Refill: 0             Plan:   1. Vaginal discharge  - " POCT Wet Prep  - POCT KOH    2. Screening for STD (sexually transmitted disease)  - Hepatitis B Surface Antigen; Future  - HIV 1/2 Ag/Ab (4th Gen); Future  - SYPHILIS ANTIBODY (WITH REFLEX RPR); Future  - Hepatitis C Antibody; Future    3. Yeast vaginitis  - fluconazole (DIFLUCAN) 200 MG Tab; 1 tablet today and repeat in 3 days  Dispense: 2 tablet; Refill: 0  - clotrimazole-betamethasone 1-0.05% (LOTRISONE) cream; Apply topically 2 (two) times daily.  Dispense: 15 g; Refill: 0    4. BV (bacterial vaginosis)  - metroNIDAZOLE (FLAGYL) 500 MG tablet; Take 1 tablet (500 mg total) by mouth 2 (two) times a day. for 7 days  Dispense: 14 tablet; Refill: 0    Wet prep: yeast, whiff, clue  Flagyl 500mg BID x7 days  AVOID: Scented soaps or shampoos, bubble bath, scented detergents, feminine sprays, douches, powders        Fragrance-free pH neutral soap     Unscented detergents   No douching   Call office if no improvement in 72 hours     Rx diflucan 200 mg and repeat in 3 days  Rx Lotrisone bid x 7 days    Oneswab GC CZ TV     Discussed the various types of STDs, related symptoms and the potential consequences (including effects on fertility) of STD infections.     Reviewed ways to limit exposure and prevention techniques.     Cultures: gc/cz/tv    Labs: HIV/RPR/Hep Panel       RTC prn    This note was transcribed by aC Maza. There may be transcription errors as a result, however minimal. Effort has been made to ensure accuracy of transcription, but any obvious errors or omissions should be clarified with the author of the document.       I agree with the above documentation.              [1]   Current Outpatient Medications on File Prior to Visit   Medication Sig Dispense Refill    [DISCONTINUED] cetirizine (ZYRTEC) 10 MG tablet Take 1 tablet (10 mg total) by mouth once daily. (Patient not taking: Reported on 5/29/2025) 30 tablet 0    [DISCONTINUED] ipratropium (ATROVENT) 21 mcg (0.03 %) nasal spray 2 sprays by Each  Nostril route 3 (three) times daily. (Patient not taking: Reported on 5/29/2025) 5 mL 0    [DISCONTINUED] naproxen (NAPROSYN) 500 MG tablet Take 1 tablet (500 mg total) by mouth 2 (two) times daily as needed (pain). (Patient not taking: Reported on 5/29/2025) 60 tablet 0     No current facility-administered medications on file prior to visit.

## 2025-06-16 ENCOUNTER — HOSPITAL ENCOUNTER (EMERGENCY)
Facility: HOSPITAL | Age: 30
Discharge: LEFT BEFORE DISCHARGE | End: 2025-06-16
Attending: INTERNAL MEDICINE
Payer: MEDICAID

## 2025-06-16 VITALS
RESPIRATION RATE: 18 BRPM | DIASTOLIC BLOOD PRESSURE: 79 MMHG | BODY MASS INDEX: 25.6 KG/M2 | SYSTOLIC BLOOD PRESSURE: 124 MMHG | OXYGEN SATURATION: 100 % | WEIGHT: 149.94 LBS | HEART RATE: 89 BPM | HEIGHT: 64 IN

## 2025-06-16 DIAGNOSIS — F10.929 ALCOHOLIC INTOXICATION WITH COMPLICATION: Primary | ICD-10-CM

## 2025-06-16 DIAGNOSIS — R56.9 SEIZURE-LIKE ACTIVITY: ICD-10-CM

## 2025-06-16 PROBLEM — J45.909 ASTHMA: Status: ACTIVE | Noted: 2025-06-16

## 2025-06-16 PROBLEM — F41.9 ANXIETY: Status: ACTIVE | Noted: 2025-06-16

## 2025-06-16 LAB
ALBUMIN SERPL-MCNC: 4.1 G/DL (ref 3.5–5)
ALBUMIN/GLOB SERPL: 1.1 RATIO (ref 1.1–2)
ALP SERPL-CCNC: 63 UNIT/L (ref 40–150)
ALT SERPL-CCNC: 22 UNIT/L (ref 0–55)
ANION GAP SERPL CALC-SCNC: 10 MEQ/L
AST SERPL-CCNC: 34 UNIT/L (ref 11–45)
BASOPHILS # BLD AUTO: 0.04 X10(3)/MCL
BASOPHILS NFR BLD AUTO: 0.8 %
BILIRUB SERPL-MCNC: <0.5 MG/DL
BUN SERPL-MCNC: 11 MG/DL (ref 7–18.7)
CALCIUM SERPL-MCNC: 8.9 MG/DL (ref 8.4–10.2)
CHLORIDE SERPL-SCNC: 115 MMOL/L (ref 98–107)
CO2 SERPL-SCNC: 22 MMOL/L (ref 22–29)
CREAT SERPL-MCNC: 0.83 MG/DL (ref 0.55–1.02)
CREAT/UREA NIT SERPL: 13
EOSINOPHIL # BLD AUTO: 0.25 X10(3)/MCL (ref 0–0.9)
EOSINOPHIL NFR BLD AUTO: 4.9 %
ERYTHROCYTE [DISTWIDTH] IN BLOOD BY AUTOMATED COUNT: 15.9 % (ref 11.5–17)
ETHANOL SERPL-MCNC: 208 MG/DL
GFR SERPLBLD CREATININE-BSD FMLA CKD-EPI: >60 ML/MIN/1.73/M2
GLOBULIN SER-MCNC: 3.8 GM/DL (ref 2.4–3.5)
GLUCOSE SERPL-MCNC: 91 MG/DL (ref 74–100)
HCT VFR BLD AUTO: 35.9 % (ref 37–47)
HGB BLD-MCNC: 11.7 G/DL (ref 12–16)
IMM GRANULOCYTES # BLD AUTO: 0.01 X10(3)/MCL (ref 0–0.04)
IMM GRANULOCYTES NFR BLD AUTO: 0.2 %
LYMPHOCYTES # BLD AUTO: 2.34 X10(3)/MCL (ref 0.6–4.6)
LYMPHOCYTES NFR BLD AUTO: 46.2 %
MCH RBC QN AUTO: 27.9 PG (ref 27–31)
MCHC RBC AUTO-ENTMCNC: 32.6 G/DL (ref 33–36)
MCV RBC AUTO: 85.5 FL (ref 80–94)
MONOCYTES # BLD AUTO: 0.35 X10(3)/MCL (ref 0.1–1.3)
MONOCYTES NFR BLD AUTO: 6.9 %
NEUTROPHILS # BLD AUTO: 2.08 X10(3)/MCL (ref 2.1–9.2)
NEUTROPHILS NFR BLD AUTO: 41 %
NRBC BLD AUTO-RTO: 0 %
PLATELET # BLD AUTO: 179 X10(3)/MCL (ref 130–400)
PMV BLD AUTO: 12.5 FL (ref 7.4–10.4)
POTASSIUM SERPL-SCNC: 3.4 MMOL/L (ref 3.5–5.1)
PROT SERPL-MCNC: 7.9 GM/DL (ref 6.4–8.3)
RBC # BLD AUTO: 4.2 X10(6)/MCL (ref 4.2–5.4)
SODIUM SERPL-SCNC: 147 MMOL/L (ref 136–145)
WBC # BLD AUTO: 5.07 X10(3)/MCL (ref 4.5–11.5)

## 2025-06-16 PROCEDURE — 80053 COMPREHEN METABOLIC PANEL: CPT | Performed by: INTERNAL MEDICINE

## 2025-06-16 PROCEDURE — 82077 ASSAY SPEC XCP UR&BREATH IA: CPT | Performed by: INTERNAL MEDICINE

## 2025-06-16 PROCEDURE — 99284 EMERGENCY DEPT VISIT MOD MDM: CPT | Mod: 25

## 2025-06-16 PROCEDURE — 96374 THER/PROPH/DIAG INJ IV PUSH: CPT

## 2025-06-16 PROCEDURE — 63600175 PHARM REV CODE 636 W HCPCS: Performed by: INTERNAL MEDICINE

## 2025-06-16 PROCEDURE — 85025 COMPLETE CBC W/AUTO DIFF WBC: CPT | Performed by: INTERNAL MEDICINE

## 2025-06-16 RX ORDER — LORAZEPAM 2 MG/ML
1 INJECTION INTRAMUSCULAR
Status: COMPLETED | OUTPATIENT
Start: 2025-06-16 | End: 2025-06-16

## 2025-06-16 RX ADMIN — LORAZEPAM 1 MG: 2 INJECTION INTRAMUSCULAR; INTRAVENOUS at 03:06

## 2025-06-16 NOTE — ED PROVIDER NOTES
Encounter Date: 2025  History from patient's accomplice     History     Chief Complaint   Patient presents with    Altered Mental Status     Presents via POV after possible seizure. Initially labile, rapidly regaining full consciousness except for confused state. C/O nausea. Skin w/d. Resp e/u.     HPI    Harley Jones is 30 y.o. female who  has a past medical history of Anxiety, Asthma, H/O gonorrhea, H/O trichomoniasis, Hypertension, Infertility, female, Ovarian cyst, Seizures, STD (sexually transmitted disease), and Tobacco user. arrives in ER with c/o Altered Mental Status (Presents via POV after possible seizure. Initially labile, rapidly regaining full consciousness except for confused state. C/O nausea. Skin w/d. Resp e/u.)    Review of patient's allergies indicates:  No Known Allergies  Past Medical History:   Diagnosis Date    Anxiety     Asthma     H/O gonorrhea     H/O trichomoniasis     Hypertension     Infertility, female     Ovarian cyst     Seizures     STD (sexually transmitted disease)     Tobacco user      Past Surgical History:   Procedure Laterality Date     SECTION       Family History   Problem Relation Name Age of Onset    Hypertension Mother Jose Funez     Hypertension Father Mert Jones Jr     Hypertension Brother Jayjay Funez     Breast cancer Maternal Aunt Kenny     Stomach cancer Maternal Uncle Daniel     Colon cancer Neg Hx      Ovarian cancer Neg Hx      Uterine cancer Neg Hx       Social History[1]  Review of Systems   Psychiatric/Behavioral:  Positive for confusion.        Physical Exam     Initial Vitals [25 0317]   BP Pulse Resp Temp SpO2   (!) 128/96 (!) 118 18 -- 100 %      MAP       --         Physical Exam    Nursing note and vitals reviewed.  Constitutional: She appears well-developed and well-nourished.   When I went to see her she started having seizure   Eyes: EOM are normal.   Neck: Neck supple.   Cardiovascular:  Normal rate.            Pulmonary/Chest: Breath sounds normal. No respiratory distress. She has no wheezes.   Abdominal: Abdomen is soft. Bowel sounds are normal. She exhibits no distension. There is no abdominal tenderness.   Musculoskeletal:         General: No edema.      Cervical back: Neck supple.     Neurological:   Seizure   Skin: Skin is warm and dry.         ED Course   Procedures  Orders Placed This Encounter    CBC auto differential    Comprehensive metabolic panel    Ethanol    Drug Screen, Urine    Urinalysis, Reflex to Urine Culture Urine, Clean Catch    CBC with Differential    Insert Saline lock IV    LORazepam injection 1 mg       Labs Reviewed   COMPREHENSIVE METABOLIC PANEL - Abnormal       Result Value    Sodium 147 (*)     Potassium 3.4 (*)     Chloride 115 (*)     CO2 22      Glucose 91      Blood Urea Nitrogen 11.0      Creatinine 0.83      Calcium 8.9      Protein Total 7.9      Albumin 4.1      Globulin 3.8 (*)     Albumin/Globulin Ratio 1.1      Bilirubin Total <0.5      ALP 63      ALT 22      AST 34      eGFR >60      Anion Gap 10.0      BUN/Creatinine Ratio 13     ALCOHOL,MEDICAL (ETHANOL) - Abnormal    Ethanol Level 208.0 (*)    CBC WITH DIFFERENTIAL - Abnormal    WBC 5.07      RBC 4.20      Hgb 11.7 (*)     Hct 35.9 (*)     MCV 85.5      MCH 27.9      MCHC 32.6 (*)     RDW 15.9      Platelet 179      MPV 12.5 (*)     Neut % 41.0      Lymph % 46.2      Mono % 6.9      Eos % 4.9      Basophil % 0.8      Imm Grans % 0.2      Neut # 2.08 (*)     Lymph # 2.34      Mono # 0.35      Eos # 0.25      Baso # 0.04      Imm Gran # 0.01      NRBC% 0.0     CBC W/ AUTO DIFFERENTIAL    Narrative:     The following orders were created for panel order CBC auto differential.  Procedure                               Abnormality         Status                     ---------                               -----------         ------                     CBC with Differential[[id number]]       Abnormal            Final result                  Please view results for these tests on the individual orders.   DRUG SCREEN, URINE (BEAKER)   URINALYSIS, REFLEX TO URINE CULTURE          Imaging Results    None          Medications   LORazepam injection 1 mg (1 mg Intravenous Given 25 0329)     Medical Decision Making    Harley Jones is 30 y.o. female who  has a past medical history of Anxiety, Asthma, H/O gonorrhea, H/O trichomoniasis, Hypertension, Infertility, female, Ovarian cyst, Seizures, STD (sexually transmitted disease), and Tobacco user. arrives in ER with c/o Altered Mental Status (Presents via POV after possible seizure. Initially labile, rapidly regaining full consciousness except for confused state. C/O nausea. Skin w/d. Resp e/u.)    Patient is brought in with complaint of altered mental status, active pull his denies seeing her having seizure, once she arrived she was awake and talking to the nurses and when I was entering the room she started having a seizure, decided to put an IV line and give her Ativan.  Will do a detailed workup and decide further.    Amount and/or Complexity of Data Reviewed  Labs: ordered.    Risk  Prescription drug management.               ED Course as of 25 0455      0345 Patient's woke up and now she wants to know why she keeps having seizures, she says she has a an appointment with the neurologist, she says she did drink some vodka today, she says her uncle  in her arms ever since then she has been depressed has been a year. [GQ]      ED Course User Index  [GQ] Gerry Holt MD                           Clinical Impression:  Final diagnoses:  [F10.929] Alcoholic intoxication with complication (Primary)  [R56.9] Seizure-like activity          ED Disposition Condition    AMA                       [1]   Social History  Tobacco Use    Smoking status: Some Days     Types: Cigarettes     Start date:      Passive exposure: Never    Smokeless tobacco: Never    Tobacco  comments:     Starting cessation 1 year ago   Substance Use Topics    Alcohol use: Yes     Comment: occasionally    Drug use: Yes     Frequency: 7.0 times per week     Types: Benzodiazepines, Marijuana        Gerry Holt MD  06/16/25 0459

## 2025-06-24 NOTE — PROGRESS NOTES
"Chief Complaint:  gardasil    History of Present Illness:  Harley is a 30 y.o.  presents for Gardasil injection #1  No complaints today.         Gyn History:  Menstrual History  Cycle: Yes  Menarche Age: 1 years  Flow Duration: 7  Flow: (!) Heavy  Interval: 28  Intermenstrual Bleeding: No  Dysmenorrhea: Yes  Dysmenorrhea Severity : (!) Severe    Menopause  Menopause Age: 1 years    Pap History  Last pap date: 24  Result: Normal  History of Abnormal Pap: No  HPV Vaccine Completed: No    [place]  Sexually Active: Yes  Sexual Orientation: heterosexual  Postcoital Bleeding: No  Dyspareunia: No  STI History: Yes  STI Type: GC, Trichomonas  Contraception: No    Breast History  Last Breast Imaging Date: No  History of Breast Biopsy: No        Review of Systems:  Patient reports no abdominal pain. She reports no hematuria, no abnormal bleeding, no flank pain, no trouble urinating, no incontinence, no rash, no lesion, no discharge, no vaginal odor, and no vaginal itching.       Physical Exam:  /82   Temp 96.8 °F (36 °C)   Ht 5' 4" (1.626 m)   Wt 69.4 kg (153 lb)   LMP 2025 (Exact Date)   BMI 26.26 kg/m²     Constitutional: General appearance: healthy, well-nourished and well-developed   Psychiatric:  Orientation to time, place and person. Normal mood and affect and active, alert       Assessment/Plan:  1. Need for HPV vaccination  -     hpv vaccine,9-stanley (GARDASIL 9) vaccine 0.5 mL       Human papilloma virus HPV is associated with anogenital cancer (including cervical, vaginal, vulvar, penile, and anal), oropharyngeal cancer, and genital warts.       The HPV vaccine significantly reduces the incidence of anogenital cancer and genital warts       Patient tolerated well     Safe sex STI precautions     3 injection series:  initiation, 2 months, and 6 months  RTC in 2 months for gardasil #2          This note was transcribed by Erin Hobbs MA. There may be transcription errors as a result, " however minimal. I agree with transcription and every effort has been made to ensure accuracy of transcription, but any obvious errors or omissions should be clarified with the author of the document.

## 2025-06-25 ENCOUNTER — OFFICE VISIT (OUTPATIENT)
Dept: OBSTETRICS AND GYNECOLOGY | Facility: CLINIC | Age: 30
End: 2025-06-25
Payer: MEDICAID

## 2025-06-25 VITALS
SYSTOLIC BLOOD PRESSURE: 126 MMHG | HEIGHT: 64 IN | DIASTOLIC BLOOD PRESSURE: 82 MMHG | TEMPERATURE: 97 F | WEIGHT: 153 LBS | BODY MASS INDEX: 26.12 KG/M2

## 2025-06-25 DIAGNOSIS — Z23 NEED FOR HPV VACCINATION: Primary | ICD-10-CM

## 2025-06-25 PROCEDURE — 90651 9VHPV VACCINE 2/3 DOSE IM: CPT | Mod: ,,, | Performed by: NURSE PRACTITIONER

## 2025-06-25 PROCEDURE — 90471 IMMUNIZATION ADMIN: CPT | Mod: ,,, | Performed by: NURSE PRACTITIONER

## 2025-06-25 PROCEDURE — 99212 OFFICE O/P EST SF 10 MIN: CPT | Mod: 25,,, | Performed by: NURSE PRACTITIONER

## 2025-08-26 ENCOUNTER — OFFICE VISIT (OUTPATIENT)
Dept: OBSTETRICS AND GYNECOLOGY | Facility: CLINIC | Age: 30
End: 2025-08-26
Payer: MEDICAID

## 2025-08-26 ENCOUNTER — LAB VISIT (OUTPATIENT)
Dept: LAB | Facility: HOSPITAL | Age: 30
End: 2025-08-26
Attending: NURSE PRACTITIONER
Payer: MEDICAID

## 2025-08-26 VITALS
HEIGHT: 64 IN | DIASTOLIC BLOOD PRESSURE: 84 MMHG | SYSTOLIC BLOOD PRESSURE: 128 MMHG | WEIGHT: 149.81 LBS | BODY MASS INDEX: 25.57 KG/M2

## 2025-08-26 DIAGNOSIS — N76.0 BV (BACTERIAL VAGINOSIS): ICD-10-CM

## 2025-08-26 DIAGNOSIS — N89.8 VAGINAL DISCHARGE: Primary | ICD-10-CM

## 2025-08-26 DIAGNOSIS — R30.0 BURNING WITH URINATION: ICD-10-CM

## 2025-08-26 DIAGNOSIS — Z11.3 SCREENING EXAMINATION FOR VENEREAL DISEASE: ICD-10-CM

## 2025-08-26 DIAGNOSIS — B96.89 BV (BACTERIAL VAGINOSIS): ICD-10-CM

## 2025-08-26 LAB
BACTERIA HYPHAE, POC: NEGATIVE
BILIRUB UR QL STRIP: NEGATIVE
GARDNERELLA VAGINALIS: NEGATIVE
GLUCOSE UR QL STRIP: NEGATIVE
KETONES UR QL STRIP: NEGATIVE
LEUKOCYTE ESTERASE UR QL STRIP: NEGATIVE
OTHER MICROSC. OBSERVATIONS: ABNORMAL
PH, POC UA: 6
POC BACTERIAL VAGINOSIS: POSITIVE
POC BLOOD, URINE: NEGATIVE
POC CLUE CELLS: POSITIVE
POC NITRATES, URINE: NEGATIVE
PROT UR QL STRIP: NEGATIVE
SP GR UR STRIP: 1.02 (ref 1–1.03)
TRICHOMONAS, POC: NEGATIVE
UROBILINOGEN UR STRIP-ACNC: 1 (ref 0.1–1.1)
YEAST WET PREP: NEGATIVE
YEAST, POC: NEGATIVE

## 2025-08-26 RX ORDER — METRONIDAZOLE 500 MG/1
500 TABLET ORAL 2 TIMES DAILY
Qty: 14 TABLET | Refills: 0 | Status: SHIPPED | OUTPATIENT
Start: 2025-08-26 | End: 2025-09-02

## 2025-08-28 ENCOUNTER — OFFICE VISIT (OUTPATIENT)
Dept: OBSTETRICS AND GYNECOLOGY | Facility: CLINIC | Age: 30
End: 2025-08-28
Payer: MEDICAID

## 2025-08-28 VITALS
HEIGHT: 64 IN | DIASTOLIC BLOOD PRESSURE: 78 MMHG | BODY MASS INDEX: 26.15 KG/M2 | WEIGHT: 153.19 LBS | SYSTOLIC BLOOD PRESSURE: 118 MMHG

## 2025-08-28 DIAGNOSIS — Z31.9 DESIRE FOR PREGNANCY: ICD-10-CM

## 2025-08-28 DIAGNOSIS — Z01.419 ROUTINE GYNECOLOGICAL EXAMINATION: Primary | ICD-10-CM

## 2025-08-28 PROCEDURE — 87624 HPV HI-RISK TYP POOLED RSLT: CPT | Performed by: NURSE PRACTITIONER

## 2025-08-28 RX ORDER — AMOXICILLIN 875 MG/1
875 TABLET, COATED ORAL 2 TIMES DAILY
COMMUNITY
Start: 2025-08-26
